# Patient Record
Sex: MALE | Race: WHITE | Employment: OTHER
[De-identification: names, ages, dates, MRNs, and addresses within clinical notes are randomized per-mention and may not be internally consistent; named-entity substitution may affect disease eponyms.]

---

## 2017-02-23 ENCOUNTER — OFFICE VISIT (OUTPATIENT)
Dept: INTERNAL MEDICINE | Facility: CLINIC | Age: 67
End: 2017-02-23

## 2017-02-23 VITALS
TEMPERATURE: 98.2 F | HEART RATE: 104 BPM | DIASTOLIC BLOOD PRESSURE: 60 MMHG | BODY MASS INDEX: 22.26 KG/M2 | RESPIRATION RATE: 16 BRPM | SYSTOLIC BLOOD PRESSURE: 134 MMHG | WEIGHT: 140 LBS

## 2017-02-23 DIAGNOSIS — E78.00 HIGH CHOLESTEROL: ICD-10-CM

## 2017-02-23 DIAGNOSIS — R73.9 HYPERGLYCEMIA: Primary | ICD-10-CM

## 2017-02-23 LAB
GLUCOSE BLD-MCNC: 144 MG/DL
HBA1C MFR BLD: 6.4 %

## 2017-02-23 PROCEDURE — G8428 CUR MEDS NOT DOCUMENT: HCPCS | Performed by: INTERNAL MEDICINE

## 2017-02-23 PROCEDURE — G8419 CALC BMI OUT NRM PARAM NOF/U: HCPCS | Performed by: INTERNAL MEDICINE

## 2017-02-23 PROCEDURE — 99213 OFFICE O/P EST LOW 20 MIN: CPT | Performed by: INTERNAL MEDICINE

## 2017-02-23 PROCEDURE — G8484 FLU IMMUNIZE NO ADMIN: HCPCS | Performed by: INTERNAL MEDICINE

## 2017-02-23 PROCEDURE — 4004F PT TOBACCO SCREEN RCVD TLK: CPT | Performed by: INTERNAL MEDICINE

## 2017-02-23 PROCEDURE — 83036 HEMOGLOBIN GLYCOSYLATED A1C: CPT | Performed by: INTERNAL MEDICINE

## 2017-02-23 PROCEDURE — 4040F PNEUMOC VAC/ADMIN/RCVD: CPT | Performed by: INTERNAL MEDICINE

## 2017-02-23 PROCEDURE — 82962 GLUCOSE BLOOD TEST: CPT | Performed by: INTERNAL MEDICINE

## 2017-02-23 PROCEDURE — 1123F ACP DISCUSS/DSCN MKR DOCD: CPT | Performed by: INTERNAL MEDICINE

## 2017-02-23 PROCEDURE — 3017F COLORECTAL CA SCREEN DOC REV: CPT | Performed by: INTERNAL MEDICINE

## 2017-05-17 RX ORDER — ATORVASTATIN CALCIUM 20 MG/1
TABLET, FILM COATED ORAL
Qty: 30 TABLET | Refills: 2 | Status: SHIPPED | OUTPATIENT
Start: 2017-05-17 | End: 2017-06-06 | Stop reason: SDUPTHER

## 2017-06-06 ENCOUNTER — OFFICE VISIT (OUTPATIENT)
Dept: INTERNAL MEDICINE CLINIC | Age: 67
End: 2017-06-06
Payer: MEDICARE

## 2017-06-06 VITALS
BODY MASS INDEX: 23.3 KG/M2 | TEMPERATURE: 97.7 F | HEIGHT: 66 IN | HEART RATE: 84 BPM | SYSTOLIC BLOOD PRESSURE: 146 MMHG | RESPIRATION RATE: 16 BRPM | WEIGHT: 145 LBS | DIASTOLIC BLOOD PRESSURE: 72 MMHG

## 2017-06-06 DIAGNOSIS — R73.9 HYPERGLYCEMIA: ICD-10-CM

## 2017-06-06 DIAGNOSIS — K21.9 GASTROESOPHAGEAL REFLUX DISEASE WITHOUT ESOPHAGITIS: ICD-10-CM

## 2017-06-06 DIAGNOSIS — E78.00 HIGH CHOLESTEROL: Primary | ICD-10-CM

## 2017-06-06 LAB
GLUCOSE BLD-MCNC: 130 MG/DL
HBA1C MFR BLD: 6.6 %

## 2017-06-06 PROCEDURE — 99214 OFFICE O/P EST MOD 30 MIN: CPT | Performed by: INTERNAL MEDICINE

## 2017-06-06 PROCEDURE — 3017F COLORECTAL CA SCREEN DOC REV: CPT | Performed by: INTERNAL MEDICINE

## 2017-06-06 PROCEDURE — 1123F ACP DISCUSS/DSCN MKR DOCD: CPT | Performed by: INTERNAL MEDICINE

## 2017-06-06 PROCEDURE — 4004F PT TOBACCO SCREEN RCVD TLK: CPT | Performed by: INTERNAL MEDICINE

## 2017-06-06 PROCEDURE — 82962 GLUCOSE BLOOD TEST: CPT | Performed by: INTERNAL MEDICINE

## 2017-06-06 PROCEDURE — 4040F PNEUMOC VAC/ADMIN/RCVD: CPT | Performed by: INTERNAL MEDICINE

## 2017-06-06 PROCEDURE — G8420 CALC BMI NORM PARAMETERS: HCPCS | Performed by: INTERNAL MEDICINE

## 2017-06-06 PROCEDURE — 83036 HEMOGLOBIN GLYCOSYLATED A1C: CPT | Performed by: INTERNAL MEDICINE

## 2017-06-06 PROCEDURE — G8427 DOCREV CUR MEDS BY ELIG CLIN: HCPCS | Performed by: INTERNAL MEDICINE

## 2017-06-06 RX ORDER — ATORVASTATIN CALCIUM 20 MG/1
TABLET, FILM COATED ORAL
Qty: 45 TABLET | Refills: 1 | Status: SHIPPED | OUTPATIENT
Start: 2017-06-06 | End: 2018-01-12 | Stop reason: CLARIF

## 2017-06-06 RX ORDER — OMEPRAZOLE 40 MG/1
40 CAPSULE, DELAYED RELEASE ORAL DAILY
Qty: 30 CAPSULE | Refills: 0 | Status: SHIPPED | OUTPATIENT
Start: 2017-06-06 | End: 2017-07-05 | Stop reason: SDUPTHER

## 2017-07-05 RX ORDER — OMEPRAZOLE 40 MG/1
CAPSULE, DELAYED RELEASE ORAL
Qty: 30 CAPSULE | Refills: 5 | Status: SHIPPED | OUTPATIENT
Start: 2017-07-05 | End: 2018-01-12 | Stop reason: SDUPTHER

## 2017-09-08 ENCOUNTER — HOSPITAL ENCOUNTER (OUTPATIENT)
Age: 67
Setting detail: SPECIMEN
Discharge: HOME | End: 2017-09-08
Payer: MEDICARE

## 2017-09-08 DIAGNOSIS — E78.00 HIGH CHOLESTEROL: ICD-10-CM

## 2017-09-08 DIAGNOSIS — R73.9 HYPERGLYCEMIA: ICD-10-CM

## 2017-09-08 DIAGNOSIS — K21.9 GASTROESOPHAGEAL REFLUX DISEASE WITHOUT ESOPHAGITIS: ICD-10-CM

## 2017-09-08 LAB
ALBUMIN SERPL-MCNC: 4.2 G/DL (ref 3.5–5.2)
ALBUMIN/GLOBULIN RATIO: 1.4 (ref 1–2.5)
ALP BLD-CCNC: 91 U/L (ref 40–129)
ALT SERPL-CCNC: 20 U/L (ref 5–41)
ANION GAP SERPL CALCULATED.3IONS-SCNC: 13 MMOL/L (ref 9–17)
AST SERPL-CCNC: 19 U/L
BILIRUB SERPL-MCNC: 0.25 MG/DL (ref 0.3–1.2)
BUN BLDV-MCNC: 10 MG/DL (ref 8–23)
BUN/CREAT BLD: ABNORMAL (ref 9–20)
CALCIUM SERPL-MCNC: 9.4 MG/DL (ref 8.6–10.4)
CHLORIDE BLD-SCNC: 102 MMOL/L (ref 98–107)
CHOLESTEROL/HDL RATIO: 7
CHOLESTEROL: 260 MG/DL
CO2: 24 MMOL/L (ref 20–31)
CREAT SERPL-MCNC: 0.88 MG/DL (ref 0.7–1.2)
CREATININE URINE: 89 MG/DL (ref 39–259)
ESTIMATED AVERAGE GLUCOSE: 157 MG/DL
GFR AFRICAN AMERICAN: >60 ML/MIN
GFR NON-AFRICAN AMERICAN: >60 ML/MIN
GFR SERPL CREATININE-BSD FRML MDRD: ABNORMAL ML/MIN/{1.73_M2}
GFR SERPL CREATININE-BSD FRML MDRD: ABNORMAL ML/MIN/{1.73_M2}
GLUCOSE BLD-MCNC: 142 MG/DL (ref 70–99)
HBA1C MFR BLD: 7.1 % (ref 4–6)
HDLC SERPL-MCNC: 37 MG/DL
LDL CHOLESTEROL: 160 MG/DL (ref 0–130)
MICROALBUMIN/CREAT 24H UR: 35 MG/L
MICROALBUMIN/CREAT UR-RTO: 39 MCG/MG CREAT
POTASSIUM SERPL-SCNC: 4.8 MMOL/L (ref 3.7–5.3)
SODIUM BLD-SCNC: 139 MMOL/L (ref 135–144)
TOTAL PROTEIN: 7.1 G/DL (ref 6.4–8.3)
TRIGL SERPL-MCNC: 317 MG/DL
VLDLC SERPL CALC-MCNC: ABNORMAL MG/DL (ref 1–30)

## 2017-09-12 ENCOUNTER — OFFICE VISIT (OUTPATIENT)
Dept: INTERNAL MEDICINE CLINIC | Age: 67
End: 2017-09-12
Payer: MEDICARE

## 2017-09-12 VITALS
WEIGHT: 148.8 LBS | SYSTOLIC BLOOD PRESSURE: 128 MMHG | HEART RATE: 80 BPM | RESPIRATION RATE: 16 BRPM | TEMPERATURE: 97.7 F | HEIGHT: 67 IN | BODY MASS INDEX: 23.35 KG/M2 | DIASTOLIC BLOOD PRESSURE: 70 MMHG

## 2017-09-12 DIAGNOSIS — M25.511 CHRONIC RIGHT SHOULDER PAIN: ICD-10-CM

## 2017-09-12 DIAGNOSIS — B35.1 TOENAIL FUNGUS: ICD-10-CM

## 2017-09-12 DIAGNOSIS — E78.00 HIGH CHOLESTEROL: ICD-10-CM

## 2017-09-12 DIAGNOSIS — K21.9 GASTROESOPHAGEAL REFLUX DISEASE WITHOUT ESOPHAGITIS: ICD-10-CM

## 2017-09-12 DIAGNOSIS — K64.9 HEMORRHOIDS, UNSPECIFIED HEMORRHOID TYPE: ICD-10-CM

## 2017-09-12 DIAGNOSIS — E11.9 TYPE 2 DIABETES MELLITUS WITHOUT COMPLICATION, WITHOUT LONG-TERM CURRENT USE OF INSULIN (HCC): Primary | ICD-10-CM

## 2017-09-12 DIAGNOSIS — G89.29 CHRONIC RIGHT SHOULDER PAIN: ICD-10-CM

## 2017-09-12 DIAGNOSIS — R21 SKIN RASH: ICD-10-CM

## 2017-09-12 PROCEDURE — G8420 CALC BMI NORM PARAMETERS: HCPCS | Performed by: INTERNAL MEDICINE

## 2017-09-12 PROCEDURE — 4004F PT TOBACCO SCREEN RCVD TLK: CPT | Performed by: INTERNAL MEDICINE

## 2017-09-12 PROCEDURE — 4040F PNEUMOC VAC/ADMIN/RCVD: CPT | Performed by: INTERNAL MEDICINE

## 2017-09-12 PROCEDURE — G8427 DOCREV CUR MEDS BY ELIG CLIN: HCPCS | Performed by: INTERNAL MEDICINE

## 2017-09-12 PROCEDURE — 99215 OFFICE O/P EST HI 40 MIN: CPT | Performed by: INTERNAL MEDICINE

## 2017-09-12 PROCEDURE — 3046F HEMOGLOBIN A1C LEVEL >9.0%: CPT | Performed by: INTERNAL MEDICINE

## 2017-09-12 PROCEDURE — 1123F ACP DISCUSS/DSCN MKR DOCD: CPT | Performed by: INTERNAL MEDICINE

## 2017-09-12 PROCEDURE — 3017F COLORECTAL CA SCREEN DOC REV: CPT | Performed by: INTERNAL MEDICINE

## 2017-09-12 RX ORDER — DESONIDE 0.5 MG/G
CREAM TOPICAL
Qty: 1 TUBE | Refills: 0 | Status: SHIPPED | OUTPATIENT
Start: 2017-09-12 | End: 2020-01-08

## 2017-09-12 RX ORDER — PERMETHRIN 50 MG/G
CREAM TOPICAL
Qty: 1 TUBE | Refills: 0 | Status: SHIPPED | OUTPATIENT
Start: 2017-09-12 | End: 2018-01-12 | Stop reason: CLARIF

## 2017-09-14 ENCOUNTER — TELEPHONE (OUTPATIENT)
Dept: INTERNAL MEDICINE CLINIC | Age: 67
End: 2017-09-14

## 2017-09-26 ENCOUNTER — HOSPITAL ENCOUNTER (OUTPATIENT)
Age: 67
Discharge: HOME | End: 2017-09-26
Payer: MEDICARE

## 2017-09-26 ENCOUNTER — HOSPITAL ENCOUNTER (OUTPATIENT)
Dept: GENERAL RADIOLOGY | Age: 67
Discharge: HOME | End: 2017-09-26
Payer: MEDICARE

## 2017-09-26 ENCOUNTER — OFFICE VISIT (OUTPATIENT)
Dept: ORTHOPEDIC SURGERY | Age: 67
End: 2017-09-26
Payer: MEDICARE

## 2017-09-26 VITALS — HEIGHT: 67 IN | BODY MASS INDEX: 23.36 KG/M2 | WEIGHT: 148.81 LBS

## 2017-09-26 DIAGNOSIS — M19.011 PRIMARY OSTEOARTHRITIS OF RIGHT SHOULDER: ICD-10-CM

## 2017-09-26 DIAGNOSIS — M47.812 SPONDYLOSIS OF CERVICAL REGION WITHOUT MYELOPATHY OR RADICULOPATHY: ICD-10-CM

## 2017-09-26 DIAGNOSIS — M47.812 SPONDYLOSIS OF CERVICAL REGION WITHOUT MYELOPATHY OR RADICULOPATHY: Primary | ICD-10-CM

## 2017-09-26 PROCEDURE — 1123F ACP DISCUSS/DSCN MKR DOCD: CPT | Performed by: ORTHOPAEDIC SURGERY

## 2017-09-26 PROCEDURE — 73030 X-RAY EXAM OF SHOULDER: CPT

## 2017-09-26 PROCEDURE — 72040 X-RAY EXAM NECK SPINE 2-3 VW: CPT

## 2017-09-26 PROCEDURE — 99213 OFFICE O/P EST LOW 20 MIN: CPT | Performed by: ORTHOPAEDIC SURGERY

## 2017-09-26 PROCEDURE — G8427 DOCREV CUR MEDS BY ELIG CLIN: HCPCS | Performed by: ORTHOPAEDIC SURGERY

## 2017-09-26 PROCEDURE — 3017F COLORECTAL CA SCREEN DOC REV: CPT | Performed by: ORTHOPAEDIC SURGERY

## 2017-09-26 PROCEDURE — 73020 X-RAY EXAM OF SHOULDER: CPT

## 2017-09-26 PROCEDURE — G8420 CALC BMI NORM PARAMETERS: HCPCS | Performed by: ORTHOPAEDIC SURGERY

## 2017-09-26 PROCEDURE — 4004F PT TOBACCO SCREEN RCVD TLK: CPT | Performed by: ORTHOPAEDIC SURGERY

## 2017-09-26 PROCEDURE — 4040F PNEUMOC VAC/ADMIN/RCVD: CPT | Performed by: ORTHOPAEDIC SURGERY

## 2017-09-29 ENCOUNTER — OFFICE VISIT (OUTPATIENT)
Dept: INTERNAL MEDICINE CLINIC | Age: 67
End: 2017-09-29
Payer: MEDICARE

## 2017-09-29 VITALS
OXYGEN SATURATION: 97 % | HEART RATE: 94 BPM | HEIGHT: 67 IN | TEMPERATURE: 98.7 F | SYSTOLIC BLOOD PRESSURE: 128 MMHG | BODY MASS INDEX: 23.32 KG/M2 | DIASTOLIC BLOOD PRESSURE: 78 MMHG | WEIGHT: 148.6 LBS

## 2017-09-29 DIAGNOSIS — R41.3 MEMORY PROBLEM: Primary | ICD-10-CM

## 2017-09-29 PROCEDURE — G8427 DOCREV CUR MEDS BY ELIG CLIN: HCPCS | Performed by: INTERNAL MEDICINE

## 2017-09-29 PROCEDURE — 4040F PNEUMOC VAC/ADMIN/RCVD: CPT | Performed by: INTERNAL MEDICINE

## 2017-09-29 PROCEDURE — 3017F COLORECTAL CA SCREEN DOC REV: CPT | Performed by: INTERNAL MEDICINE

## 2017-09-29 PROCEDURE — G8420 CALC BMI NORM PARAMETERS: HCPCS | Performed by: INTERNAL MEDICINE

## 2017-09-29 PROCEDURE — 1123F ACP DISCUSS/DSCN MKR DOCD: CPT | Performed by: INTERNAL MEDICINE

## 2017-09-29 PROCEDURE — 99213 OFFICE O/P EST LOW 20 MIN: CPT | Performed by: INTERNAL MEDICINE

## 2017-09-29 PROCEDURE — 4004F PT TOBACCO SCREEN RCVD TLK: CPT | Performed by: INTERNAL MEDICINE

## 2017-09-29 NOTE — PROGRESS NOTES
hematuria   Musculoskeletal: No arthralgias, back pain or myalgias   Skin                  : Negative for rash or erythema   Neurological    : Negative for dizziness, weakness, tremors ,light headedness or syncope   Psychiatric       : Negative for dysphoric mood, sleep disturbances, nervous or anxious, or decreased concentration   All other review of systems was negative    Objective  Physical Examination:    Nursing note reviewed    /78 (Site: Right Arm, Position: Sitting, Cuff Size: Medium Adult)  Pulse 94  Temp 98.7 °F (37.1 °C) (Oral)   Ht 5' 6.93\" (1.7 m)  Wt 148 lb 9.6 oz (67.4 kg)  SpO2 97%  BMI 23.32 kg/m2  BP Readings from Last 3 Encounters:   09/29/17 128/78   09/12/17 128/70   06/06/17 (!) 146/72         Constitutional:  Stephanie Ordoñez is oriented to place, person and time ,appears well-developed and well-nourished  HEENT:  Atraumatic and normocephalic, external ears normal bilaterally, nose normal no oropharyngeal exudate and is clear and moist  Eyes:  EOCM normal; conjunctivae normal; PERRLA bilaterally  Neck:  Normal range of motion, neck supple, no JVD and no thyromegaly  Cardiovascular:  RRR, normal heart sounds and intact distal pulses  Pulmonary:  effort normal and breath sounds normal bilaterally,no wheezes or rales, no respiratory distress  Abdominal:  Soft, non-tender; normal bowel sounds, no masses  Musculoskeletal:  Normal range of motion and no edema or tenderness bilaterally  No lymphadenopathy  Neurological:  alert, oriented, and normal reflexes bilaterally  Skin: warm and dry  Psychiatric:  normal mood and effect; behavior normal.    Labs:   Lab Results   Component Value Date    LABA1C 7.1 (H) 09/08/2017     Lab Results   Component Value Date    CHOL 260 (H) 09/08/2017     Lab Results   Component Value Date    HDL 37 (L) 09/08/2017     No results found for: [address]  Lab Results   Component Value Date    TRIG 317 (H) 09/08/2017     No components found for: [place], [place]  Lab Results Component Value Date    WBC 15.5 (H) 10/18/2016    HGB 13.9 10/18/2016    HCT 42.1 10/18/2016    MCV 95.6 10/18/2016     (H) 10/18/2016     No results found for: INR, PROTIME  Lab Results   Component Value Date    GLUCOSE 142 (H) 09/08/2017    CREATININE 0.88 09/08/2017    BUN 10 09/08/2017     09/08/2017    K 4.8 09/08/2017     09/08/2017    CO2 24 09/08/2017     Lab Results   Component Value Date    ALT 20 09/08/2017    AST 19 09/08/2017    ALKPHOS 91 09/08/2017    BILITOT 0.25 (L) 09/08/2017     Lab Results   Component Value Date    LABALBU 4.2 09/08/2017     Lab Results   Component Value Date    TSH 2.31 10/18/2016     Assessment:  1. Memory problem        Plan:  Mini-Mental Status Examination done  Patient is referred to neurology for evaluation for dementia  Review as scheduled           1. Nicho Ramirez received counseling on the following healthy behaviors: nutrition, exercise and tobacco cessation    2. Prior labs and health maintenance reviewed. 3.  Discussed use, benefit, and side effects of prescribed medications. Barriers to medication compliance addressed. All his questions were answered. Pt voiced understanding. Nicho Ramirez will continue current medications, diet and exercise. No orders of the defined types were placed in this encounter. Completed Refills               Requested Prescriptions      No prescriptions requested or ordered in this encounter     4. Patient given educational materials - see patient instructions    5. Was a self-tracking handout given in paper form or via [employer]? NO    No orders of the defined types were placed in this encounter. No Follow-up on file. Patient voiced understanding and agreed to treatment plan.      Electronically signed by Denis Nogueira MD on 9/29/2017 at 11:52 AM    This note is created with a voice recognition program and while intend to generate a document that accurately reflects the content of the visit, no

## 2017-10-05 ENCOUNTER — HOSPITAL ENCOUNTER (OUTPATIENT)
Age: 67
Discharge: HOME | End: 2017-10-05
Payer: MEDICARE

## 2017-10-05 ENCOUNTER — HOSPITAL ENCOUNTER (OUTPATIENT)
Dept: GENERAL RADIOLOGY | Age: 67
Discharge: HOME | End: 2017-10-05
Payer: MEDICARE

## 2017-10-05 ENCOUNTER — OFFICE VISIT (OUTPATIENT)
Dept: ORTHOPEDIC SURGERY | Age: 67
End: 2017-10-05
Payer: MEDICARE

## 2017-10-05 VITALS — HEIGHT: 67 IN | BODY MASS INDEX: 23.32 KG/M2 | WEIGHT: 148.59 LBS

## 2017-10-05 DIAGNOSIS — Z87.891 HISTORY OF PRIOR CIGARETTE SMOKING: ICD-10-CM

## 2017-10-05 DIAGNOSIS — M47.812 SPONDYLOSIS OF CERVICAL REGION WITHOUT MYELOPATHY OR RADICULOPATHY: Primary | ICD-10-CM

## 2017-10-05 PROCEDURE — 99213 OFFICE O/P EST LOW 20 MIN: CPT | Performed by: ORTHOPAEDIC SURGERY

## 2017-10-05 PROCEDURE — G8420 CALC BMI NORM PARAMETERS: HCPCS | Performed by: ORTHOPAEDIC SURGERY

## 2017-10-05 PROCEDURE — G8427 DOCREV CUR MEDS BY ELIG CLIN: HCPCS | Performed by: ORTHOPAEDIC SURGERY

## 2017-10-05 PROCEDURE — 1123F ACP DISCUSS/DSCN MKR DOCD: CPT | Performed by: ORTHOPAEDIC SURGERY

## 2017-10-05 PROCEDURE — 4040F PNEUMOC VAC/ADMIN/RCVD: CPT | Performed by: ORTHOPAEDIC SURGERY

## 2017-10-05 PROCEDURE — 71020 XR CHEST STANDARD TWO VW: CPT

## 2017-10-05 PROCEDURE — 4004F PT TOBACCO SCREEN RCVD TLK: CPT | Performed by: ORTHOPAEDIC SURGERY

## 2017-10-05 PROCEDURE — 3017F COLORECTAL CA SCREEN DOC REV: CPT | Performed by: ORTHOPAEDIC SURGERY

## 2017-10-05 PROCEDURE — G8484 FLU IMMUNIZE NO ADMIN: HCPCS | Performed by: ORTHOPAEDIC SURGERY

## 2017-10-05 NOTE — PROGRESS NOTES
This patient who was seen on September 26 is seen here again still having pain over the right shoulder. In the last dictation in the third    I mentioned that the caretaker thought that he was very forgetful. On one occasion he got into a stranger's car and the stranger then dropped him back about 4 hours later. The patient does not recall this event at all. He had x-rays of the cervical spine and the shoulder. They could not do them last visit because they had another appointment. X-rays of the cervical spine show there is significant degenerative changes in the lower cervical spine. X-ray of the shoulder shows mild degenerative changes in the acromioclavicular joint. Diagnosis: Cervical spondylosis. #2 right acromioclavicular joint osteoarthritis. Treatment: I'm referring him to physical therapy. He can also take Aleve with food because he does have GERD and takes Protonix for it. I'll see him in 1 month.

## 2017-10-05 NOTE — MR AVS SNAPSHOT
After Visit Summary             Evelyn Crouch   10/5/2017 9:30 AM   Office Visit    Description:  Male : 1950   Provider:  Everardo Conte MD   Department:  [address]              Your Follow-Up and Future Appointments         Below is a list of your follow-up and future appointments. This may not be a complete list as you may have made appointments directly with providers that we are not aware of or your providers may have made some for you. Please call your providers to confirm appointments. It is important to keep your appointments. Please bring your current insurance card, photo ID, co-pay, and all medication bottles to your appointment. If self-pay, payment is expected at the time of service. Your To-Do List     Future Appointments Provider Department Dept Phone    2017 9:10 AM MD [address] [phone]    Please arrive 15 minutes prior to appointment time, bring insurance card and photo ID.     2017 10:15 AM Ailyn Torrez MD [facility] [phone]    Please arrive 15 minutes prior to appointment, bring photo ID and insurance card. Future Orders Complete By Expires    XR CHEST STANDARD (2 VW) [41102 Custom]  10/5/2017 (Approximate) 10/25/2018    Comments:    AP - Lateral    Follow-Up    Return in about 4 weeks (around 2017).          Information from Your Visit        Department     Name Address Phone Fax    [address] [phone]      You Were Seen for:         Comments    Spondylosis of cervical region without myelopathy or radiculopathy   [[id number]]         Vital Signs     Height Weight Body Mass Index Smoking Status          5' 6.93\" (1.7 m) 148 lb 9.4 oz (67.4 kg) 23.32 kg/m2 Current Every Day Smoker           Medications and Orders      Your Current Medications Are Use a nicotine replacement product or medication       Immunizations as of 10/5/2017     Name Date    Influenza, Kaitlin Ebbs, 3 Years and older, IM 10/19/2016      Preventive Care        Date Due    Eye Exam By An Eye Doctor 11/29/1958    Tetanus Combination Vaccine (1 - Tdap) 11/29/1967    Zoster Vaccine 11/29/2008    Yearly Flu Vaccine (1) 9/1/2017    Pneumococcal Vaccines (two) for all adults aged 72 and over (1 of 2 - PCV13) 6/4/2018 (Originally 11/29/2013)    Hepatitis C screening is recommended for all adults regardless of risk factors born between [facility] [place] [facility] at least once (lifetime) who have never been tested. 9/14/2018 (Originally 1950)    One-time abdominal aortic aneurism (AAA) screening is recommended for all men between the age of 73-68 who have ever smoked 9/20/2018 (Originally 1950)    Hemoglobin A1C (Test For Long-Term Glucose Control) 9/8/2018    Urine Check For Kidney Problems 9/8/2018    Cholesterol Screening 9/8/2018    Diabetic Foot Exam 9/12/2018    Colonoscopy 4/8/2019            MyChart Signup           Our records indicate that you have declined MyChart signup.

## 2017-10-16 ENCOUNTER — HOSPITAL ENCOUNTER (OUTPATIENT)
Dept: PHYSICAL THERAPY | Facility: CLINIC | Age: 67
Setting detail: THERAPIES SERIES
Discharge: HOME | End: 2017-10-16
Payer: MEDICARE

## 2017-11-13 ENCOUNTER — OFFICE VISIT (OUTPATIENT)
Dept: INTERNAL MEDICINE CLINIC | Age: 67
End: 2017-11-13
Payer: MEDICARE

## 2017-11-13 VITALS
HEART RATE: 104 BPM | TEMPERATURE: 97.9 F | DIASTOLIC BLOOD PRESSURE: 74 MMHG | HEIGHT: 67 IN | SYSTOLIC BLOOD PRESSURE: 144 MMHG | BODY MASS INDEX: 23.2 KG/M2 | WEIGHT: 147.8 LBS | RESPIRATION RATE: 16 BRPM

## 2017-11-13 DIAGNOSIS — E78.00 HIGH CHOLESTEROL: ICD-10-CM

## 2017-11-13 DIAGNOSIS — Z23 NEED FOR INFLUENZA VACCINATION: ICD-10-CM

## 2017-11-13 DIAGNOSIS — R41.3 MEMORY PROBLEM: ICD-10-CM

## 2017-11-13 DIAGNOSIS — E11.9 TYPE 2 DIABETES MELLITUS WITHOUT COMPLICATION, WITHOUT LONG-TERM CURRENT USE OF INSULIN (HCC): Primary | ICD-10-CM

## 2017-11-13 PROCEDURE — 3045F PR MOST RECENT HEMOGLOBIN A1C LEVEL 7.0-9.0%: CPT | Performed by: INTERNAL MEDICINE

## 2017-11-13 PROCEDURE — 4040F PNEUMOC VAC/ADMIN/RCVD: CPT | Performed by: INTERNAL MEDICINE

## 2017-11-13 PROCEDURE — G8484 FLU IMMUNIZE NO ADMIN: HCPCS | Performed by: INTERNAL MEDICINE

## 2017-11-13 PROCEDURE — 1123F ACP DISCUSS/DSCN MKR DOCD: CPT | Performed by: INTERNAL MEDICINE

## 2017-11-13 PROCEDURE — 99214 OFFICE O/P EST MOD 30 MIN: CPT | Performed by: INTERNAL MEDICINE

## 2017-11-13 PROCEDURE — G8427 DOCREV CUR MEDS BY ELIG CLIN: HCPCS | Performed by: INTERNAL MEDICINE

## 2017-11-13 PROCEDURE — G0008 ADMIN INFLUENZA VIRUS VAC: HCPCS | Performed by: INTERNAL MEDICINE

## 2017-11-13 PROCEDURE — G8420 CALC BMI NORM PARAMETERS: HCPCS | Performed by: INTERNAL MEDICINE

## 2017-11-13 PROCEDURE — 3017F COLORECTAL CA SCREEN DOC REV: CPT | Performed by: INTERNAL MEDICINE

## 2017-11-13 PROCEDURE — 90688 IIV4 VACCINE SPLT 0.5 ML IM: CPT | Performed by: INTERNAL MEDICINE

## 2017-11-13 PROCEDURE — 4004F PT TOBACCO SCREEN RCVD TLK: CPT | Performed by: INTERNAL MEDICINE

## 2017-11-13 NOTE — PROGRESS NOTES
 Diabetic microalbuminuria test  09/08/2018    Lipid screen  09/08/2018    Diabetic foot exam  09/12/2018    Colon cancer screen colonoscopy  04/08/2019

## 2017-11-13 NOTE — PROGRESS NOTES
of appetite and unexpected weight change   HEENT            : Negative for neck stiffness and pain, no congestion or sinus pressure   Eyes                : No visual disturbance or pain   Cardiovascular: No chest pain or palpitations or leg swelling   Respiratory      : Negative for cough, shortness of breath or wheezing   Gastrointestinal: Negative for abdominal pain, constipation or diarrhea and bloating No nausea or vomiting   Genitourinary:     No urgency or frequency, no burning or hematuria   Musculoskeletal: No arthralgias, back pain or myalgias   Skin                  : Negative for rash or erythema   Neurological    : Negative for dizziness, weakness, tremors ,light headedness or syncope   Psychiatric       : Negative for dysphoric mood, sleep disturbances, nervous or anxious, or decreased concentration   All other review of systems was negative    Objective  Physical Examination:    Nursing note reviewed    BP (!) 144/74   Pulse 104   Temp 97.9 °F (36.6 °C) (Oral)   Resp 16   Ht 5' 7\" (1.702 m)   Wt 147 lb 12.8 oz (67 kg)   BMI 23.15 kg/m²   BP Readings from Last 3 Encounters:   11/13/17 (!) 144/74   09/29/17 128/78   09/12/17 128/70         Constitutional:  Jj Vo is oriented to place, person and time ,appears well-developed and well-nourished  HEENT:  Atraumatic and normocephalic, external ears normal bilaterally, nose normal no oropharyngeal exudate and is clear and moist  Eyes:  EOCM normal; conjunctivae normal; PERRLA bilaterally  Neck:  Normal range of motion, neck supple, no JVD and no thyromegaly  Cardiovascular:  RRR, normal heart sounds and intact distal pulses  Pulmonary:  effort normal and breath sounds normal bilaterally,no wheezes or rales, no respiratory distress  Abdominal:  Soft, non-tender; normal bowel sounds, no masses  Musculoskeletal:  Normal range of motion and no edema or tenderness bilaterally  No lymphadenopathy  Neurological:  alert, oriented, and normal reflexes bilaterally  Skin: warm and dry  Psychiatric:  normal mood and effect; behavior normal.    Labs:   Lab Results   Component Value Date    LABA1C 7.1 (H) 09/08/2017     Lab Results   Component Value Date    CHOL 260 (H) 09/08/2017     Lab Results   Component Value Date    HDL 37 (L) 09/08/2017     No results found for: [address]  Lab Results   Component Value Date    TRIG 317 (H) 09/08/2017     No components found for: [place], [place]  Lab Results   Component Value Date    WBC 15.5 (H) 10/18/2016    HGB 13.9 10/18/2016    HCT 42.1 10/18/2016    MCV 95.6 10/18/2016     (H) 10/18/2016     No results found for: INR, PROTIME  Lab Results   Component Value Date    GLUCOSE 142 (H) 09/08/2017    CREATININE 0.88 09/08/2017    BUN 10 09/08/2017     09/08/2017    K 4.8 09/08/2017     09/08/2017    CO2 24 09/08/2017     Lab Results   Component Value Date    ALT 20 09/08/2017    AST 19 09/08/2017    ALKPHOS 91 09/08/2017    BILITOT 0.25 (L) 09/08/2017     Lab Results   Component Value Date    LABALBU 4.2 09/08/2017     Lab Results   Component Value Date    TSH 2.31 10/18/2016     Assessment:  1. Type 2 diabetes mellitus without complication, without long-term current use of insulin ([address]    2. Need for influenza vaccination    3. High cholesterol    4.  Memory problem        Plan:  Patient's blood sugars in the last about 2 months reviewed which were still elevated both fasting and postprandial and so advised patient to increase metformin to twice a day and monitor blood sugars and will repeat lab work in 2 months  Patient then caregiver says that they did not see the neurologist as recommended for memory problems because there was a death in the family and never made an appointment again and so advised to make another appointment and referral made as needed  Patient's blood pressure is elevated and advised to monitor and will recheck in 2 months and if this still high will start on medication  Patient is currently taking created with a voice recognition program and while intend to generate a document that accurately reflects the content of the visit, no guarantee can be provided that every mistake has been identified and corrected by editing.

## 2017-11-21 ENCOUNTER — OFFICE VISIT (OUTPATIENT)
Dept: ORTHOPEDIC SURGERY | Age: 67
End: 2017-11-21
Payer: MEDICARE

## 2017-11-21 VITALS — BODY MASS INDEX: 23.18 KG/M2 | HEIGHT: 67 IN | WEIGHT: 147.71 LBS

## 2017-11-21 DIAGNOSIS — M47.812 SPONDYLOSIS OF CERVICAL REGION WITHOUT MYELOPATHY OR RADICULOPATHY: Primary | ICD-10-CM

## 2017-11-21 DIAGNOSIS — M75.101 TEAR OF RIGHT ROTATOR CUFF, UNSPECIFIED TEAR EXTENT: ICD-10-CM

## 2017-11-21 PROCEDURE — 3017F COLORECTAL CA SCREEN DOC REV: CPT | Performed by: ORTHOPAEDIC SURGERY

## 2017-11-21 PROCEDURE — G8420 CALC BMI NORM PARAMETERS: HCPCS | Performed by: ORTHOPAEDIC SURGERY

## 2017-11-21 PROCEDURE — G8427 DOCREV CUR MEDS BY ELIG CLIN: HCPCS | Performed by: ORTHOPAEDIC SURGERY

## 2017-11-21 PROCEDURE — 99213 OFFICE O/P EST LOW 20 MIN: CPT | Performed by: ORTHOPAEDIC SURGERY

## 2017-11-21 PROCEDURE — 4040F PNEUMOC VAC/ADMIN/RCVD: CPT | Performed by: ORTHOPAEDIC SURGERY

## 2017-11-21 PROCEDURE — 1123F ACP DISCUSS/DSCN MKR DOCD: CPT | Performed by: ORTHOPAEDIC SURGERY

## 2017-11-21 PROCEDURE — G8484 FLU IMMUNIZE NO ADMIN: HCPCS | Performed by: ORTHOPAEDIC SURGERY

## 2017-11-21 PROCEDURE — 4004F PT TOBACCO SCREEN RCVD TLK: CPT | Performed by: ORTHOPAEDIC SURGERY

## 2017-11-21 NOTE — PROGRESS NOTES
This patient states that he is doing much better now. He has no symptoms. Examination of cervical spine does show there is still some stiffness in extension and lateral flexion to left and right but without any pain. The shoulder has excellent motion. I also told him that his chest x-ray shows COPD no obvious cancer. He may return to normal activities and we'll see him as necessary.

## 2018-01-10 ENCOUNTER — HOSPITAL ENCOUNTER (OUTPATIENT)
Age: 68
Setting detail: SPECIMEN
Discharge: HOME | End: 2018-01-10
Payer: MEDICARE

## 2018-01-10 DIAGNOSIS — E11.9 TYPE 2 DIABETES MELLITUS WITHOUT COMPLICATION, WITHOUT LONG-TERM CURRENT USE OF INSULIN (HCC): ICD-10-CM

## 2018-01-10 DIAGNOSIS — Z23 NEED FOR INFLUENZA VACCINATION: ICD-10-CM

## 2018-01-10 DIAGNOSIS — E78.00 HIGH CHOLESTEROL: ICD-10-CM

## 2018-01-10 DIAGNOSIS — R41.3 MEMORY PROBLEM: ICD-10-CM

## 2018-01-10 LAB
ALBUMIN SERPL-MCNC: 3.9 G/DL (ref 3.5–5.2)
ALBUMIN/GLOBULIN RATIO: 1 (ref 1–2.5)
ALP BLD-CCNC: 101 U/L (ref 40–129)
ALT SERPL-CCNC: 22 U/L (ref 5–41)
ANION GAP SERPL CALCULATED.3IONS-SCNC: 17 MMOL/L (ref 9–17)
AST SERPL-CCNC: 22 U/L
BILIRUB SERPL-MCNC: 0.32 MG/DL (ref 0.3–1.2)
BUN BLDV-MCNC: 12 MG/DL (ref 8–23)
BUN/CREAT BLD: ABNORMAL (ref 9–20)
CALCIUM SERPL-MCNC: 9.5 MG/DL (ref 8.6–10.4)
CHLORIDE BLD-SCNC: 97 MMOL/L (ref 98–107)
CHOLESTEROL/HDL RATIO: 4.1
CHOLESTEROL: 176 MG/DL
CO2: 24 MMOL/L (ref 20–31)
CREAT SERPL-MCNC: 0.85 MG/DL (ref 0.7–1.2)
CREATININE URINE: 192.5 MG/DL (ref 39–259)
ESTIMATED AVERAGE GLUCOSE: 137 MG/DL
GFR AFRICAN AMERICAN: >60 ML/MIN
GFR NON-AFRICAN AMERICAN: >60 ML/MIN
GFR SERPL CREATININE-BSD FRML MDRD: ABNORMAL ML/MIN/{1.73_M2}
GFR SERPL CREATININE-BSD FRML MDRD: ABNORMAL ML/MIN/{1.73_M2}
GLUCOSE BLD-MCNC: 155 MG/DL (ref 70–99)
HBA1C MFR BLD: 6.4 % (ref 4–6)
HCT VFR BLD CALC: 40.1 % (ref 40.7–50.3)
HDLC SERPL-MCNC: 43 MG/DL
HEMOGLOBIN: 12.8 G/DL (ref 13–17)
LDL CHOLESTEROL: 110 MG/DL (ref 0–130)
MCH RBC QN AUTO: 31.6 PG (ref 25.2–33.5)
MCHC RBC AUTO-ENTMCNC: 31.9 G/DL (ref 28.4–34.8)
MCV RBC AUTO: 99 FL (ref 82.6–102.9)
MICROALBUMIN/CREAT 24H UR: 145 MG/L
MICROALBUMIN/CREAT UR-RTO: 75 MCG/MG CREAT
PDW BLD-RTO: 12.6 % (ref 11.8–14.4)
PLATELET # BLD: 539 K/UL (ref 138–453)
PMV BLD AUTO: 10.9 FL (ref 8.1–13.5)
POTASSIUM SERPL-SCNC: 4.3 MMOL/L (ref 3.7–5.3)
RBC # BLD: 4.05 M/UL (ref 4.21–5.77)
SODIUM BLD-SCNC: 138 MMOL/L (ref 135–144)
TOTAL PROTEIN: 7.8 G/DL (ref 6.4–8.3)
TRIGL SERPL-MCNC: 113 MG/DL
VLDLC SERPL CALC-MCNC: NORMAL MG/DL (ref 1–30)
WBC # BLD: 24.1 K/UL (ref 3.5–11.3)

## 2018-01-12 ENCOUNTER — HOSPITAL ENCOUNTER (OUTPATIENT)
Dept: GENERAL RADIOLOGY | Age: 68
Discharge: HOME | End: 2018-01-12
Payer: MEDICARE

## 2018-01-12 ENCOUNTER — HOSPITAL ENCOUNTER (OUTPATIENT)
Age: 68
Discharge: HOME | End: 2018-01-12
Payer: MEDICARE

## 2018-01-12 ENCOUNTER — TELEPHONE (OUTPATIENT)
Dept: INTERNAL MEDICINE CLINIC | Age: 68
End: 2018-01-12

## 2018-01-12 ENCOUNTER — OFFICE VISIT (OUTPATIENT)
Dept: INTERNAL MEDICINE CLINIC | Age: 68
End: 2018-01-12
Payer: MEDICARE

## 2018-01-12 VITALS
HEART RATE: 96 BPM | BODY MASS INDEX: 22.7 KG/M2 | DIASTOLIC BLOOD PRESSURE: 60 MMHG | RESPIRATION RATE: 18 BRPM | SYSTOLIC BLOOD PRESSURE: 110 MMHG | WEIGHT: 144.6 LBS | HEIGHT: 67 IN | TEMPERATURE: 98.3 F

## 2018-01-12 DIAGNOSIS — E78.00 HIGH CHOLESTEROL: ICD-10-CM

## 2018-01-12 DIAGNOSIS — E11.9 TYPE 2 DIABETES MELLITUS WITHOUT COMPLICATION, WITHOUT LONG-TERM CURRENT USE OF INSULIN (HCC): ICD-10-CM

## 2018-01-12 DIAGNOSIS — D72.829 LEUKOCYTOSIS, UNSPECIFIED TYPE: ICD-10-CM

## 2018-01-12 DIAGNOSIS — R05.9 COUGH: ICD-10-CM

## 2018-01-12 DIAGNOSIS — D72.829 LEUKOCYTOSIS, UNSPECIFIED TYPE: Primary | ICD-10-CM

## 2018-01-12 DIAGNOSIS — R05.9 COUGH: Primary | ICD-10-CM

## 2018-01-12 LAB
ABSOLUTE EOS #: 0 K/UL (ref 0–0.4)
ABSOLUTE IMMATURE GRANULOCYTE: ABNORMAL K/UL (ref 0–0.3)
ABSOLUTE LYMPH #: 3.78 K/UL (ref 1–4.8)
ABSOLUTE MONO #: 2.7 K/UL (ref 0.2–0.8)
BASOPHILS # BLD: 0 %
BASOPHILS ABSOLUTE: 0 K/UL (ref 0–0.2)
DIFFERENTIAL TYPE: ABNORMAL
EOSINOPHILS RELATIVE PERCENT: 0 % (ref 1–4)
HCT VFR BLD CALC: 37.6 % (ref 41–53)
HEMOGLOBIN: 12.4 G/DL (ref 13.5–17.5)
IMMATURE GRANULOCYTES: ABNORMAL %
LACTATE DEHYDROGENASE: 181 U/L (ref 135–225)
LYMPHOCYTES # BLD: 14 % (ref 24–44)
MCH RBC QN AUTO: 31.4 PG (ref 26–34)
MCHC RBC AUTO-ENTMCNC: 33.1 G/DL (ref 31–37)
MCV RBC AUTO: 94.9 FL (ref 80–100)
MONOCYTES # BLD: 10 % (ref 1–7)
MORPHOLOGY: ABNORMAL
PDW BLD-RTO: 13 % (ref 11.5–14.5)
PLATELET # BLD: 671 K/UL (ref 130–400)
PLATELET ESTIMATE: ABNORMAL
PMV BLD AUTO: 8.1 FL (ref 6–12)
RBC # BLD: 3.96 M/UL (ref 4.5–5.9)
RBC # BLD: ABNORMAL 10*6/UL
SEG NEUTROPHILS: 76 % (ref 36–66)
SEGMENTED NEUTROPHILS ABSOLUTE COUNT: 20.52 K/UL (ref 1.8–7.7)
WBC # BLD: 27 K/UL (ref 3.5–11)
WBC # BLD: ABNORMAL 10*3/UL

## 2018-01-12 PROCEDURE — G8484 FLU IMMUNIZE NO ADMIN: HCPCS | Performed by: INTERNAL MEDICINE

## 2018-01-12 PROCEDURE — 3017F COLORECTAL CA SCREEN DOC REV: CPT | Performed by: INTERNAL MEDICINE

## 2018-01-12 PROCEDURE — 71046 X-RAY EXAM CHEST 2 VIEWS: CPT

## 2018-01-12 PROCEDURE — 3044F HG A1C LEVEL LT 7.0%: CPT | Performed by: INTERNAL MEDICINE

## 2018-01-12 PROCEDURE — 4040F PNEUMOC VAC/ADMIN/RCVD: CPT | Performed by: INTERNAL MEDICINE

## 2018-01-12 PROCEDURE — 36415 COLL VENOUS BLD VENIPUNCTURE: CPT

## 2018-01-12 PROCEDURE — G8427 DOCREV CUR MEDS BY ELIG CLIN: HCPCS | Performed by: INTERNAL MEDICINE

## 2018-01-12 PROCEDURE — 85025 COMPLETE CBC W/AUTO DIFF WBC: CPT

## 2018-01-12 PROCEDURE — 1123F ACP DISCUSS/DSCN MKR DOCD: CPT | Performed by: INTERNAL MEDICINE

## 2018-01-12 PROCEDURE — G8420 CALC BMI NORM PARAMETERS: HCPCS | Performed by: INTERNAL MEDICINE

## 2018-01-12 PROCEDURE — 4004F PT TOBACCO SCREEN RCVD TLK: CPT | Performed by: INTERNAL MEDICINE

## 2018-01-12 PROCEDURE — 83615 LACTATE (LD) (LDH) ENZYME: CPT

## 2018-01-12 PROCEDURE — 99214 OFFICE O/P EST MOD 30 MIN: CPT | Performed by: INTERNAL MEDICINE

## 2018-01-12 RX ORDER — LEVOFLOXACIN 500 MG/1
500 TABLET, FILM COATED ORAL DAILY
Qty: 10 TABLET | Refills: 0 | Status: SHIPPED | OUTPATIENT
Start: 2018-01-12 | End: 2018-01-22

## 2018-01-12 RX ORDER — ATORVASTATIN CALCIUM 20 MG/1
20 TABLET, FILM COATED ORAL DAILY
Qty: 30 TABLET | Refills: 3 | Status: SHIPPED | OUTPATIENT
Start: 2018-01-12 | End: 2018-05-18 | Stop reason: SDUPTHER

## 2018-01-12 RX ORDER — OMEPRAZOLE 40 MG/1
CAPSULE, DELAYED RELEASE ORAL
Qty: 30 CAPSULE | Refills: 5 | Status: SHIPPED | OUTPATIENT
Start: 2018-01-12 | End: 2018-08-22 | Stop reason: SDUPTHER

## 2018-01-12 ASSESSMENT — PATIENT HEALTH QUESTIONNAIRE - PHQ9
1. LITTLE INTEREST OR PLEASURE IN DOING THINGS: 0
SUM OF ALL RESPONSES TO PHQ QUESTIONS 1-9: 0
SUM OF ALL RESPONSES TO PHQ9 QUESTIONS 1 & 2: 0
2. FEELING DOWN, DEPRESSED OR HOPELESS: 0

## 2018-01-12 NOTE — PROGRESS NOTES
test  01/10/2019    Lipid screen  01/10/2019    Colon cancer screen colonoscopy  04/08/2019    Zostavax vaccine  Completed    Flu vaccine  Completed
any evidence of for any bacterial infection other than the elevated white count  Review in 1 month           1.  Phoebe Van received counseling on the following healthy behaviors: nutrition and exercise    2. Prior labs and health maintenance reviewed. 3.  Discussed use, benefit, and side effects of prescribed medications. Barriers to medication compliance addressed. All his questions were answered. Pt voiced understanding. Phoebe Van will continue current medications, diet and exercise. Orders Placed This Encounter   Medications    atorvastatin (LIPITOR) 20 MG tablet     Sig: Take 1 tablet by mouth daily     Dispense:  30 tablet     Refill:  3          Completed Refills               Requested Prescriptions     Signed Prescriptions Disp Refills    atorvastatin (LIPITOR) 20 MG tablet 30 tablet 3     Sig: Take 1 tablet by mouth daily     4. Patient given educational materials - see patient instructions    5. Was a self-tracking handout given in paper form or via [employer]? NO    Orders Placed This Encounter   Procedures    XR CHEST STANDARD (2 VW)     Standing Status:   Future     Standing Expiration Date:   1/12/2019     Order Specific Question:   Reason for exam:     Answer:   cough    CBC With Auto Differential     Standing Status:   Future     Standing Expiration Date:   1/12/2019    Lactate Dehydrogenase     Standing Status:   Future     Standing Expiration Date:   1/12/2019     No Follow-up on file. Patient voiced understanding and agreed to treatment plan. Electronically signed by Cheyanne Braun MD on 1/12/2018 at 10:55 AM    This note is created with a voice recognition program and while intend to generate a document that accurately reflects the content of the visit, no guarantee can be provided that every mistake has been identified and corrected by editing.

## 2018-01-18 ENCOUNTER — HOSPITAL ENCOUNTER (OUTPATIENT)
Age: 68
Setting detail: SPECIMEN
Discharge: HOME | End: 2018-01-18
Payer: MEDICARE

## 2018-01-18 DIAGNOSIS — D72.829 LEUKOCYTOSIS, UNSPECIFIED TYPE: ICD-10-CM

## 2018-01-18 LAB
HCT VFR BLD CALC: 38.1 % (ref 40.7–50.3)
HEMOGLOBIN: 11.9 G/DL (ref 13–17)
MCH RBC QN AUTO: 30.4 PG (ref 25.2–33.5)
MCHC RBC AUTO-ENTMCNC: 31.2 G/DL (ref 28.4–34.8)
MCV RBC AUTO: 97.2 FL (ref 82.6–102.9)
NRBC AUTOMATED: 0 PER 100 WBC
PDW BLD-RTO: 12.8 % (ref 11.8–14.4)
PLATELET # BLD: 895 K/UL (ref 138–453)
PMV BLD AUTO: 10.1 FL (ref 8.1–13.5)
RBC # BLD: 3.92 M/UL (ref 4.21–5.77)
WBC # BLD: 19.9 K/UL (ref 3.5–11.3)

## 2018-01-19 ENCOUNTER — TELEPHONE (OUTPATIENT)
Dept: INTERNAL MEDICINE CLINIC | Age: 68
End: 2018-01-19

## 2018-01-19 DIAGNOSIS — D72.829 LEUKOCYTOSIS, UNSPECIFIED TYPE: Primary | ICD-10-CM

## 2018-01-19 DIAGNOSIS — D75.839 THROMBOCYTHEMIA: ICD-10-CM

## 2018-01-19 NOTE — TELEPHONE ENCOUNTER
Refer to Hematology and Oncology Dr. Ney Washington regarding thrombocythemia and elevated white count

## 2018-01-30 ENCOUNTER — INITIAL CONSULT (OUTPATIENT)
Dept: ONCOLOGY | Age: 68
End: 2018-01-30
Payer: MEDICARE

## 2018-01-30 ENCOUNTER — HOSPITAL ENCOUNTER (OUTPATIENT)
Age: 68
Discharge: HOME | End: 2018-01-30
Payer: MEDICARE

## 2018-01-30 VITALS
SYSTOLIC BLOOD PRESSURE: 154 MMHG | WEIGHT: 141.4 LBS | HEIGHT: 67 IN | BODY MASS INDEX: 22.19 KG/M2 | HEART RATE: 94 BPM | RESPIRATION RATE: 16 BRPM | DIASTOLIC BLOOD PRESSURE: 78 MMHG

## 2018-01-30 DIAGNOSIS — D47.1 MPN (MYELOPROLIFERATIVE NEOPLASM) (HCC): ICD-10-CM

## 2018-01-30 LAB
ABSOLUTE EOS #: 0.5 K/UL (ref 0–0.4)
ABSOLUTE IMMATURE GRANULOCYTE: ABNORMAL K/UL (ref 0–0.3)
ABSOLUTE LYMPH #: 3.5 K/UL (ref 1–4.8)
ABSOLUTE MONO #: 1.4 K/UL (ref 0.1–1.2)
ABSOLUTE RETIC #: 0.08 M/UL (ref 0.03–0.08)
ALBUMIN SERPL-MCNC: 4.2 G/DL (ref 3.5–5.2)
ALBUMIN/GLOBULIN RATIO: 1.3 (ref 1–2.5)
ALP BLD-CCNC: 103 U/L (ref 40–129)
ALT SERPL-CCNC: 27 U/L (ref 5–41)
ANION GAP SERPL CALCULATED.3IONS-SCNC: 15 MMOL/L (ref 9–17)
AST SERPL-CCNC: 27 U/L
BASOPHILS # BLD: 1 % (ref 0–2)
BASOPHILS ABSOLUTE: 0.1 K/UL (ref 0–0.2)
BILIRUB SERPL-MCNC: 0.25 MG/DL (ref 0.3–1.2)
BUN BLDV-MCNC: 11 MG/DL (ref 8–23)
BUN/CREAT BLD: ABNORMAL (ref 9–20)
CALCIUM SERPL-MCNC: 10 MG/DL (ref 8.6–10.4)
CHLORIDE BLD-SCNC: 102 MMOL/L (ref 98–107)
CO2: 25 MMOL/L (ref 20–31)
CREAT SERPL-MCNC: 0.8 MG/DL (ref 0.7–1.2)
DIFFERENTIAL TYPE: ABNORMAL
EOSINOPHILS RELATIVE PERCENT: 3 % (ref 1–4)
FOLATE: >20 NG/ML
GFR AFRICAN AMERICAN: >60 ML/MIN
GFR NON-AFRICAN AMERICAN: >60 ML/MIN
GFR SERPL CREATININE-BSD FRML MDRD: ABNORMAL ML/MIN/{1.73_M2}
GFR SERPL CREATININE-BSD FRML MDRD: ABNORMAL ML/MIN/{1.73_M2}
GLUCOSE BLD-MCNC: 120 MG/DL (ref 70–99)
HCT VFR BLD CALC: 39.4 % (ref 41–53)
HEMOGLOBIN: 13.3 G/DL (ref 13.5–17.5)
IGA: 249 MG/DL (ref 70–400)
IGG: 795 MG/DL (ref 700–1600)
IGM: 69 MG/DL (ref 40–230)
IMMATURE GRANULOCYTES: ABNORMAL %
IMMATURE RETIC FRACT: NORMAL %
LACTATE DEHYDROGENASE: 173 U/L (ref 135–225)
LYMPHOCYTES # BLD: 21 % (ref 24–44)
MCH RBC QN AUTO: 31.8 PG (ref 26–34)
MCHC RBC AUTO-ENTMCNC: 33.7 G/DL (ref 31–37)
MCV RBC AUTO: 94.4 FL (ref 80–100)
MONOCYTES # BLD: 8 % (ref 2–11)
NRBC AUTOMATED: ABNORMAL PER 100 WBC
PDW BLD-RTO: 13.7 % (ref 12.5–15.4)
PLATELET # BLD: 706 K/UL (ref 140–450)
PLATELET ESTIMATE: ABNORMAL
PMV BLD AUTO: 7.9 FL (ref 6–12)
POTASSIUM SERPL-SCNC: 5 MMOL/L (ref 3.7–5.3)
RBC # BLD: 4.18 M/UL (ref 4.5–5.9)
RBC # BLD: ABNORMAL 10*6/UL
RETIC %: 1.9 % (ref 0.5–1.9)
RETIC HEMOGLOBIN: 34.2 PG (ref 28.2–35.7)
SEG NEUTROPHILS: 67 % (ref 36–66)
SEGMENTED NEUTROPHILS ABSOLUTE COUNT: 11.1 K/UL (ref 1.8–7.7)
SODIUM BLD-SCNC: 142 MMOL/L (ref 135–144)
TOTAL PROTEIN: 7.5 G/DL (ref 6.4–8.3)
URIC ACID: 5.4 MG/DL (ref 3.4–7)
VITAMIN B-12: 606 PG/ML (ref 232–1245)
WBC # BLD: 16.6 K/UL (ref 3.5–11)
WBC # BLD: ABNORMAL 10*3/UL

## 2018-01-30 PROCEDURE — 84550 ASSAY OF BLOOD/URIC ACID: CPT

## 2018-01-30 PROCEDURE — 1123F ACP DISCUSS/DSCN MKR DOCD: CPT | Performed by: INTERNAL MEDICINE

## 2018-01-30 PROCEDURE — G8484 FLU IMMUNIZE NO ADMIN: HCPCS | Performed by: INTERNAL MEDICINE

## 2018-01-30 PROCEDURE — 36415 COLL VENOUS BLD VENIPUNCTURE: CPT

## 2018-01-30 PROCEDURE — 86334 IMMUNOFIX E-PHORESIS SERUM: CPT

## 2018-01-30 PROCEDURE — 83615 LACTATE (LD) (LDH) ENZYME: CPT

## 2018-01-30 PROCEDURE — 85025 COMPLETE CBC W/AUTO DIFF WBC: CPT

## 2018-01-30 PROCEDURE — 80053 COMPREHEN METABOLIC PANEL: CPT

## 2018-01-30 PROCEDURE — 81206 BCR/ABL1 GENE MAJOR BP: CPT

## 2018-01-30 PROCEDURE — 82746 ASSAY OF FOLIC ACID SERUM: CPT

## 2018-01-30 PROCEDURE — 81270 JAK2 GENE: CPT

## 2018-01-30 PROCEDURE — 99201 HC NEW PT, E/M LEVEL 1: CPT

## 2018-01-30 PROCEDURE — 85045 AUTOMATED RETICULOCYTE COUNT: CPT

## 2018-01-30 PROCEDURE — G8427 DOCREV CUR MEDS BY ELIG CLIN: HCPCS | Performed by: INTERNAL MEDICINE

## 2018-01-30 PROCEDURE — 3017F COLORECTAL CA SCREEN DOC REV: CPT | Performed by: INTERNAL MEDICINE

## 2018-01-30 PROCEDURE — 82607 VITAMIN B-12: CPT

## 2018-01-30 PROCEDURE — 99204 OFFICE O/P NEW MOD 45 MIN: CPT | Performed by: INTERNAL MEDICINE

## 2018-01-30 PROCEDURE — 4004F PT TOBACCO SCREEN RCVD TLK: CPT | Performed by: INTERNAL MEDICINE

## 2018-01-30 PROCEDURE — 82784 ASSAY IGA/IGD/IGG/IGM EACH: CPT

## 2018-01-30 PROCEDURE — G8420 CALC BMI NORM PARAMETERS: HCPCS | Performed by: INTERNAL MEDICINE

## 2018-01-30 PROCEDURE — 4040F PNEUMOC VAC/ADMIN/RCVD: CPT | Performed by: INTERNAL MEDICINE

## 2018-01-30 NOTE — PROGRESS NOTES
(GLUCOPHAGE) 500 MG tablet TAKE ONE TAB 2 TIMES A  tablet 1    desonide (DESOWEN) 0.05 % cream Apply topically 2 times daily. 1 Tube 0    hydrocortisone-pramoxine (PROCTOFOAM HC) 1-1 % rectal foam Place rectally 2 times daily. 1 Can 0    DiphenhydrAMINE HCl (ALLERGY MED PO) Take 1 tablet by mouth daily      acetaminophen (TYLENOL) 500 MG tablet Take 500 mg by mouth every 6 hours as needed for Pain      Phenylephrine-DM-GG (TUSSIN CF COUGH & COLD PO) Take by mouth as needed       No current facility-administered medications for this visit. SUBJECTIVE:  Tab Valdez is a very pleasant 69 y.o. 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     ROS:  General: negative for fatigue, fever or night sweats  ENT: negative for headaches, hearing change, oral lesions or visual changes  Hematological and Lymphatic: negative for bleeding problems, blood clots, bruising, fatigue, night sweats or weight loss  Respiratory: negative for cough, shortness of breath or tachypnea  Cardiovascular: negative for chest pain, dyspnea on exertion, edema or paroxysmal nocturnal dyspnea  Gastrointestinal: negative for abdominal pain, appetite loss, change in bowel habits, constipation, diarrhea, melena or nausea/vomiting  Genito-Urinary: negative for dysuria, hematuria or incontinence  Musculoskeletal: negative for gait disturbance, joint pain, joint swelling or muscle pain  Neurological: negative for confusion, dizziness, headaches, seizures or tremors  Dermatological: negative for pruritus or rash      PHYSICAL EXAM:   Vitals: BP (!) 154/78 (Site: Left Arm, Position: Sitting, Cuff Size: Medium Adult)   Pulse 94   Resp 16   Ht 5' 7\" (1.702 m)   Wt 141 lb 6.4 oz (64.1 kg)   BMI 22.15 kg/m²   General appearance: alert, appears stated age and cooperative  Skin: Skin color, texture, turgor normal. No rashes or lesions  HEENT: Head: Normocephalic, no lesions, without obvious

## 2018-01-31 LAB — SURGICAL PATHOLOGY REPORT: NORMAL

## 2018-02-01 LAB
PATHOLOGIST REVIEW: NORMAL
PATHOLOGIST: NORMAL
SERUM IFX INTERP: NORMAL

## 2018-02-03 LAB
BCR-ABL QUANTITATIVE: NOT DETECTED
BCR-ABL1, MAJOR, RATIO: 0
BCR-ABL1, PERCENT: 0 %
BCR/ABL SOURCE: NORMAL
EER BCR-ABL1, MAJOR: NORMAL

## 2018-02-06 DIAGNOSIS — D47.1 MPN (MYELOPROLIFERATIVE NEOPLASM) (HCC): Primary | ICD-10-CM

## 2018-02-07 LAB — V617F MUTATION, QNT: 0 %

## 2018-02-20 ENCOUNTER — OFFICE VISIT (OUTPATIENT)
Dept: ONCOLOGY | Age: 68
End: 2018-02-20
Payer: MEDICARE

## 2018-02-20 VITALS
DIASTOLIC BLOOD PRESSURE: 85 MMHG | BODY MASS INDEX: 22.82 KG/M2 | SYSTOLIC BLOOD PRESSURE: 150 MMHG | HEART RATE: 101 BPM | TEMPERATURE: 97.6 F | WEIGHT: 145.7 LBS

## 2018-02-20 DIAGNOSIS — D47.1 MPN (MYELOPROLIFERATIVE NEOPLASM) (HCC): ICD-10-CM

## 2018-02-20 PROCEDURE — 1123F ACP DISCUSS/DSCN MKR DOCD: CPT | Performed by: INTERNAL MEDICINE

## 2018-02-20 PROCEDURE — G8420 CALC BMI NORM PARAMETERS: HCPCS | Performed by: INTERNAL MEDICINE

## 2018-02-20 PROCEDURE — 4004F PT TOBACCO SCREEN RCVD TLK: CPT | Performed by: INTERNAL MEDICINE

## 2018-02-20 PROCEDURE — 3017F COLORECTAL CA SCREEN DOC REV: CPT | Performed by: INTERNAL MEDICINE

## 2018-02-20 PROCEDURE — 99213 OFFICE O/P EST LOW 20 MIN: CPT | Performed by: INTERNAL MEDICINE

## 2018-02-20 PROCEDURE — 99211 OFF/OP EST MAY X REQ PHY/QHP: CPT

## 2018-02-20 PROCEDURE — G8427 DOCREV CUR MEDS BY ELIG CLIN: HCPCS | Performed by: INTERNAL MEDICINE

## 2018-02-20 PROCEDURE — G8484 FLU IMMUNIZE NO ADMIN: HCPCS | Performed by: INTERNAL MEDICINE

## 2018-02-20 PROCEDURE — 4040F PNEUMOC VAC/ADMIN/RCVD: CPT | Performed by: INTERNAL MEDICINE

## 2018-02-20 NOTE — PROGRESS NOTES
PROGRESS NOTE    PCP: Kay Rivera MD  Referring Provider: No ref. provider found    IMPRESSION:   1. Elevated white count with predominant neutrophils  2. Mild anemia  3. Elevated platelet count  4. All the above strongly suggest a myeloproliferative neoplasm  5. Hard of hearing  6. Prior episodes of viral meningitis cervical spine pain shingles pneumonia  7. Diabetes mellitus  8. Active smoker      INTERVAL HISTORY:   LAVERNE 2 negative  BCR ABL negative. Discussed with the patient and wife the above findings. Also discussed the trend of his blood counts over the last few years  In spite spite of testing being negative he still likely has a myeloproliferative neoplasm. Options to proceed would be monitoring the blood counts every 2 months seen in 6 months, his counts are not dangerous and even be more proved to have a myeloproliferative neoplasm likely no treatments now would be necessary    Second option would be to proceed to a bone marrow examination. Bone marrow may be able to document myeloproliferative neoplasm but at times bone marrows may be negative. Third option would be getting a second opinion of the large [address]. We discussed this with the patient and his wife answered their questions. At the end of the discussion was decided we would monitor his blood counts every 2 months and reevaluate in 6 months. PLAN:     1. CBC every 2 months  2. Revisit in 6 months    Return in about 6 months (around 8/20/2018). VISIT DIAGNOSIS:  The encounter diagnosis was MPN (myeloproliferative neoplasm) ([facility]     PAST MEDICAL HISTORY:      Diagnosis Date    Pneumonia     Shingles     Viral meningitis [facility]       PAST SURGICAL HISTORY:      Procedure Laterality Date    CATARACT REMOVAL Bilateral        CURRENT MEDICATIONS:   Current Outpatient Prescriptions   Medication Sig Dispense Refill    atorvastatin (LIPITOR) 20 MG tablet Take 1 tablet by mouth daily exertion, edema or paroxysmal nocturnal dyspnea  Gastrointestinal: negative for abdominal pain, appetite loss, change in bowel habits, constipation, diarrhea, melena or nausea/vomiting  Genito-Urinary: negative for dysuria, hematuria or incontinence  Musculoskeletal: negative for gait disturbance, joint pain, joint swelling or muscle pain  Neurological: negative for confusion, dizziness, headaches, seizures or tremors  Dermatological: negative for pruritus or rash      PHYSICAL EXAM:   Vitals: BP (!) 150/85   Pulse 101   Temp 97.6 °F (36.4 °C) (Oral)   Wt 145 lb 11.2 oz (66.1 kg)   BMI 22.82 kg/m²   General appearance: alert, appears stated age and cooperative  Skin: Skin color, texture, turgor normal. No rashes or lesions  HEENT: Head: Normocephalic, no lesions, without obvious abnormality.   Neck: no adenopathy  Lungs: clear to auscultation bilaterally  Heart: regular rate and rhythm, S1, S2 normal, no murmur, click, rub or gallop  Abdomen: soft, non-tender; bowel sounds normal; no masses,  no organomegaly  Extremities: extremities normal, atraumatic, no cyanosis or edema  Neurologic: Mental status: Alert, oriented, thought content appropriate      LABS:   Results for orders placed or performed during the hospital encounter of 01/30/18   Jak2 Gene Mutation, Quantitative   Result Value Ref Range    V617F Mutation, Qnt 0.0 %   BCR-ABL Quantitative   Result Value Ref Range    BCR/ABL Source Whole Blood     BCR-ABL Quantitative Not Detected     BCR-ABL1,Major,Ratio 0.18007     BCR-ABL1, Percent 0.0000 %    EER BCR-ABL1, Major See Note    CBC Auto Differential   Result Value Ref Range    WBC 16.6 (H) 3.5 - 11.0 k/uL    RBC 4.18 (L) 4.5 - 5.9 m/uL    Hemoglobin 13.3 (L) 13.5 - 17.5 g/dL    Hematocrit 39.4 (L) 41 - 53 %    MCV 94.4 80 - 100 fL    MCH 31.8 26 - 34 pg    MCHC 33.7 31 - 37 g/dL    RDW 13.7 12.5 - 15.4 %    Platelets 640 (H) 135 - 450 k/uL    MPV 7.9 6.0 - 12.0 fL    NRBC Automated NOT REPORTED per 100

## 2018-03-08 ENCOUNTER — HOSPITAL ENCOUNTER (EMERGENCY)
Age: 68
Discharge: HOME | End: 2018-03-08
Attending: EMERGENCY MEDICINE
Payer: MEDICARE

## 2018-03-08 ENCOUNTER — APPOINTMENT (OUTPATIENT)
Dept: GENERAL RADIOLOGY | Age: 68
End: 2018-03-08
Payer: MEDICARE

## 2018-03-08 VITALS
WEIGHT: 139.55 LBS | DIASTOLIC BLOOD PRESSURE: 80 MMHG | HEART RATE: 103 BPM | OXYGEN SATURATION: 97 % | HEIGHT: 66 IN | BODY MASS INDEX: 22.43 KG/M2 | TEMPERATURE: 98 F | RESPIRATION RATE: 18 BRPM | SYSTOLIC BLOOD PRESSURE: 151 MMHG

## 2018-03-08 DIAGNOSIS — S70.02XA CONTUSION OF LEFT HIP, INITIAL ENCOUNTER: Primary | ICD-10-CM

## 2018-03-08 PROCEDURE — 99283 EMERGENCY DEPT VISIT LOW MDM: CPT

## 2018-03-08 PROCEDURE — 73502 X-RAY EXAM HIP UNI 2-3 VIEWS: CPT

## 2018-03-08 RX ORDER — ACETAMINOPHEN AND CODEINE PHOSPHATE 300; 30 MG/1; MG/1
1 TABLET ORAL EVERY 6 HOURS PRN
Qty: 12 TABLET | Refills: 0 | Status: SHIPPED | OUTPATIENT
Start: 2018-03-08 | End: 2018-03-15

## 2018-03-08 ASSESSMENT — PAIN SCALES - GENERAL: PAINLEVEL_OUTOF10: 10

## 2018-03-08 ASSESSMENT — PAIN DESCRIPTION - LOCATION: LOCATION: HIP

## 2018-03-08 ASSESSMENT — PAIN DESCRIPTION - DESCRIPTORS: DESCRIPTORS: ACHING;SHARP;STABBING

## 2018-03-08 ASSESSMENT — PAIN DESCRIPTION - ORIENTATION: ORIENTATION: LEFT

## 2018-03-08 ASSESSMENT — PAIN DESCRIPTION - PAIN TYPE: TYPE: ACUTE PAIN

## 2018-03-08 ASSESSMENT — ENCOUNTER SYMPTOMS: BACK PAIN: 0

## 2018-03-08 NOTE — ED PROVIDER NOTES
[facility] ED  eMERGENCY dEPARTMENT eNCOUnter      Pt Name: Tab Valdez  MRN: [id number]  [place] 1950  Date of evaluation: 3/8/2018  Provider: Gerald Mabry NP    CHIEF COMPLAINT       Chief Complaint   Patient presents with    Hip Pain     lt hip pain since fall 1 wk ago         HISTORY OF PRESENT ILLNESS  (Location/Symptom, Timing/Onset, Context/Setting, Quality, Duration, Modifying Factors, Severity.)   Tab Valdez is a 69 y.o. male who presents to the emergency department via private auto for left hip pain s/p fall 3/1/18 while taking his trash out. Reports landing on the area. Denies hitting his head and LOC. Denies pain to his head, neck, back. Denies N/T to his extremities. Rates his pain 10/10 at this time. Nursing Notes were reviewed. ALLERGIES     Patient has no known allergies. CURRENT MEDICATIONS       Discharge Medication List as of 3/8/2018  1:52 PM      CONTINUE these medications which have NOT CHANGED    Details   atorvastatin (LIPITOR) 20 MG tablet Take 1 tablet by mouth daily, Disp-30 tablet, R-3Normal      omeprazole (PRILOSEC) 40 MG delayed release capsule TAKE 1 CAPSULE BY MOUTH EVERY DAY, Disp-30 capsule, R-5Normal      metFORMIN (GLUCOPHAGE) 500 MG tablet TAKE ONE TAB 2 TIMES A DAY, Disp-180 tablet, R-1Normal      desonide (DESOWEN) 0.05 % cream Apply topically 2 times daily. , Disp-1 Tube, R-0, Normal      hydrocortisone-pramoxine (PROCTOFOAM HC) 1-1 % rectal foam Place rectally 2 times daily. , Disp-1 Can, R-0, Normal      Phenylephrine-DM-GG (TUSSIN CF COUGH & COLD PO) Take by mouth as needed      DiphenhydrAMINE HCl (ALLERGY MED PO) Take 1 tablet by mouth daily      acetaminophen (TYLENOL) 500 MG tablet Take 500 mg by mouth every 6 hours as needed for Pain             PAST MEDICAL HISTORY         Diagnosis Date    Pneumonia     Shingles     Viral meningitis 1991    [facility]       SURGICAL HISTORY           Procedure Laterality Date    CATARACT REMOVAL Bilateral          FAMILY HISTORY     History reviewed. No pertinent family history. No family status information on file. SOCIAL HISTORY      reports that he has been smoking Cigarettes. He has been smoking about 0.50 packs per day. He has never used smokeless tobacco. He reports that he does not drink alcohol. REVIEW OF SYSTEMS    (2-9 systems for level 4, 10 or more for level 5)     Review of Systems   Constitutional: Negative for chills, diaphoresis, fatigue and fever. Musculoskeletal: Positive for arthralgias and myalgias. Negative for back pain, gait problem and neck pain. Skin: Negative for rash and wound. Neurological: Negative for dizziness, syncope, numbness and headaches. Except as noted above the remainder of the review of systems was reviewed and negative. PHYSICAL EXAM    (up to 7 for level 4, 8 or more for level 5)     ED Triage Vitals [03/08/18 1228]   BP Temp Temp Source Pulse Resp SpO2 Height Weight   (!) 151/80 98 °F (36.7 °C) Oral 103 18 97 % 5' 6\" (1.676 m) 139 lb 8.8 oz (63.3 kg)     Physical Exam   Constitutional: He is oriented to person, place, and time. He appears well-developed and well-nourished. No distress. Eyes: Conjunctivae are normal.   Cardiovascular: Normal rate, regular rhythm and normal heart sounds. Pulmonary/Chest: Effort normal and breath sounds normal. No respiratory distress. He has no wheezes. He has no rales. Musculoskeletal:        Left hip: He exhibits tenderness. He exhibits normal range of motion, no swelling and no deformity. Cervical back: He exhibits no tenderness, no bony tenderness and no pain. Thoracic back: He exhibits no tenderness, no bony tenderness and no pain. Lumbar back: He exhibits no tenderness, no bony tenderness and no pain. Pedal pulses palpable. Neurological: He is alert and oriented to person, place, and time. Skin: Skin is warm and dry. No rash noted. He is not diaphoretic. Psychiatric: He has a normal mood and affect. His behavior is normal.   Vitals reviewed. DIAGNOSTIC RESULTS     RADIOLOGY:   Non-plain film images such as CT, Ultrasound and MRI are read by the radiologist. Susu Woodson radiographic images are visualized and preliminarily interpreted by the emergency physician with the below findings:    Interpretation per the Radiologist below, if available at the time of this note:    Xr Hip 2-3 Vw W Pelvis Left    Result Date: 3/8/2018  EXAMINATION: SINGLE VIEW OF THE PELVIS AND 2 VIEWS LEFT HIP 3/8/2018 1:22 pm COMPARISON: None. HISTORY: ORDERING SYSTEM PROVIDED HISTORY: pain, fall TECHNOLOGIST PROVIDED HISTORY: Reason for exam:->pain, fall Ordering Physician Provided Reason for Exam: Pt c/o pain to left hip. Hx of fall x 1 week ago. Acuity: Acute Type of Exam: Initial FINDINGS: Visualized osseous structures appear mildly demineralized. Pelvic ring inlet appears maintained. No displaced or diastatic pelvic bone fractures. No acute or suspicious osseous lesion identified in the pelvis or left hip. No widening of the pubic symphysis or sacroiliac joints. Joint spaces appear well maintained for patient's age. Soft tissues have a normal radiographic appearance. Suspected osteoporotic changes. No definite radiographic evidence for acute osseous abnormality. If there is concern for nondisplaced fracture, cross-sectional imaging (CT or MR) could be considered for further evaluation. EMERGENCY DEPARTMENT COURSE and DIFFERENTIAL DIAGNOSIS/MDM:   Vitals:    Vitals:    03/08/18 1228   BP: (!) 151/80   Pulse: 103   Resp: 18   Temp: 98 °F (36.7 °C)   TempSrc: Oral   SpO2: 97%   Weight: 139 lb 8.8 oz (63.3 kg)   Height: 5' 6\" (1.676 m)       CLINICAL DECISION MAKING:  The patient presented alert with a nontoxic appearance and was seen in conjunction with Dr. Rufus Crews. Imaging was negative for acute findings. OARRS was reviewed. A prescription was written for tylenol #3.  The patient was advised to not drink alcohol, drive, or operate heavy machinery while taking the tylenol #3. Follow up with pcp, return to ED if condition worsens. FINAL IMPRESSION      1. Contusion of left hip, initial encounter            Problem List  Patient Active Problem List   Diagnosis Code    [facility] D47.1         DISPOSITION/PLAN   DISPOSITION Decision To Discharge 03/08/2018 01:50:59 PM      PATIENT REFERRED TO:   Kevyn Osorio MD  [address]   [facility] [id number]    Schedule an appointment as soon as possible for a visit in 2 days      [facility] ED  [address]  [phone]    If symptoms worsen      DISCHARGE MEDICATIONS:     Discharge Medication List as of 3/8/2018  1:52 PM      START taking these medications    Details   acetaminophen-codeine (TYLENOL/CODEINE #3) 300-30 MG per tablet Take 1 tablet by mouth every 6 hours as needed for Pain (WARNING:  May cause drowsiness.  May impair ability to operate vehicles or machinery.  Do not use in combination with alcohol.) for up to 7 days. , Disp-12 tablet, R-0Print                 (Please note that portions of this note were completed with a voice recognition program.  Efforts were made to edit the dictations but occasionally words are mis-transcribed.)    KJ Reddy NP  03/08/18 5139

## 2018-03-09 ENCOUNTER — CARE COORDINATION (OUTPATIENT)
Dept: CARE COORDINATION | Age: 68
End: 2018-03-09

## 2018-03-21 ENCOUNTER — OFFICE VISIT (OUTPATIENT)
Dept: ORTHOPEDIC SURGERY | Age: 68
End: 2018-03-21
Payer: MEDICARE

## 2018-03-21 VITALS
WEIGHT: 139.2 LBS | DIASTOLIC BLOOD PRESSURE: 59 MMHG | SYSTOLIC BLOOD PRESSURE: 120 MMHG | HEART RATE: 85 BPM | HEIGHT: 66 IN | BODY MASS INDEX: 22.37 KG/M2

## 2018-03-21 DIAGNOSIS — M70.62 GREATER TROCHANTERIC BURSITIS OF LEFT HIP: Primary | ICD-10-CM

## 2018-03-21 PROCEDURE — 20610 DRAIN/INJ JOINT/BURSA W/O US: CPT | Performed by: STUDENT IN AN ORGANIZED HEALTH CARE EDUCATION/TRAINING PROGRAM

## 2018-03-21 PROCEDURE — 99214 OFFICE O/P EST MOD 30 MIN: CPT | Performed by: STUDENT IN AN ORGANIZED HEALTH CARE EDUCATION/TRAINING PROGRAM

## 2018-03-21 RX ORDER — METHYLPREDNISOLONE ACETATE 80 MG/ML
80 INJECTION, SUSPENSION INTRA-ARTICULAR; INTRALESIONAL; INTRAMUSCULAR; SOFT TISSUE ONCE
Status: COMPLETED | OUTPATIENT
Start: 2018-03-21 | End: 2018-03-22

## 2018-03-21 RX ORDER — OMEPRAZOLE 20 MG/1
40 CAPSULE, DELAYED RELEASE ORAL DAILY
COMMUNITY
End: 2018-05-22 | Stop reason: DRUGHIGH

## 2018-03-21 RX ORDER — BUPIVACAINE HYDROCHLORIDE 2.5 MG/ML
2 INJECTION, SOLUTION INFILTRATION; PERINEURAL ONCE
Status: COMPLETED | OUTPATIENT
Start: 2018-03-21 | End: 2018-03-22

## 2018-03-21 NOTE — PROGRESS NOTES
[facility]  [address] [place], [place] [address] [address] Rohan Kulkarni [id number]  Dept: [phone]    Ambulatory Orthopedic Consult      CHIEF COMPLAINT:    Chief Complaint   Patient presents with    Hip Pain     left       HISTORY OF PRESENT ILLNESS:      The patient is a 69 y.o. male who is being seen at the request of  Yoandy Simpson MD for consultation and evaluation of Left hip pain. Patient states he fell around 1 March when he was taking garbage out and lost control of the trashcan causing him to fall. Patient states he landed directly on his left hip. He noted immediate pain in the area, and has had difficulty ambulating since then. Prior to the injury patient states that he never had an issue ambulating, and did not use assistive devices for ambulation. Approximately 1 week after fall he presented to the emergency department for evaluation. Radiographs were done at this time there is no apparent findings fracture. Patient was initially diagnosed with a contusion of the left hip. Since that time the patient states that his hip has not improved, he still utilizing a cane for ambulation. He notes he is unable to lay on the left side without pain, but is able to ambulate with some discomfort. He states he is able to point with one finger, and press on the area where pain occurs. Again denies history of trauma to the hip in the past. Denies fevers, chills, nausea, vomiting, chest pain, shortness of breath, numbness or tingling in the affected leg.       Past Medical History:    Past Medical History:   Diagnosis Date    Pneumonia     Shingles     Viral meningitis 1991 [facility]       Past Surgical History:    Past Surgical History:   Procedure Laterality Date    CATARACT REMOVAL Bilateral        Current Medications:   Current Outpatient Prescriptions   Medication Sig Dispense Refill    omeprazole (PRILOSEC) 20 MG delayed release capsule Take 40 mg by mouth daily      atorvastatin (LIPITOR) 20 Specialty:   Physical Therapy     Number of Visits Requested:   1    CA ARTHROCENTESIS ASPIR&/INJ MAJOR JT/BURSA W/O US          Reviewed Subjective section with patient who did agree and confirmed everything documented. Discussed plan and patient expressed understanding of diagnosis and prognosis with plan as stated. All questions answered.      Jed Correia DO   Orthopedic Surgery Resident PGY-1  [address] [facility]

## 2018-03-22 RX ADMIN — BUPIVACAINE HYDROCHLORIDE 5 MG: 2.5 INJECTION, SOLUTION INFILTRATION; PERINEURAL at 11:06

## 2018-03-22 RX ADMIN — METHYLPREDNISOLONE ACETATE 80 MG: 80 INJECTION, SUSPENSION INTRA-ARTICULAR; INTRALESIONAL; INTRAMUSCULAR; SOFT TISSUE at 11:06

## 2018-05-02 ENCOUNTER — OFFICE VISIT (OUTPATIENT)
Dept: ORTHOPEDIC SURGERY | Age: 68
End: 2018-05-02
Payer: MEDICARE

## 2018-05-02 VITALS — BODY MASS INDEX: 22.36 KG/M2 | HEIGHT: 66 IN | WEIGHT: 139.11 LBS

## 2018-05-02 DIAGNOSIS — S76.012A STRAIN OF GLUTEUS MEDIUS OF LEFT LOWER EXTREMITY, INITIAL ENCOUNTER: Primary | ICD-10-CM

## 2018-05-02 PROCEDURE — 1123F ACP DISCUSS/DSCN MKR DOCD: CPT | Performed by: STUDENT IN AN ORGANIZED HEALTH CARE EDUCATION/TRAINING PROGRAM

## 2018-05-02 PROCEDURE — 3017F COLORECTAL CA SCREEN DOC REV: CPT | Performed by: STUDENT IN AN ORGANIZED HEALTH CARE EDUCATION/TRAINING PROGRAM

## 2018-05-02 PROCEDURE — G8427 DOCREV CUR MEDS BY ELIG CLIN: HCPCS | Performed by: STUDENT IN AN ORGANIZED HEALTH CARE EDUCATION/TRAINING PROGRAM

## 2018-05-02 PROCEDURE — 99213 OFFICE O/P EST LOW 20 MIN: CPT | Performed by: STUDENT IN AN ORGANIZED HEALTH CARE EDUCATION/TRAINING PROGRAM

## 2018-05-02 PROCEDURE — 4004F PT TOBACCO SCREEN RCVD TLK: CPT | Performed by: STUDENT IN AN ORGANIZED HEALTH CARE EDUCATION/TRAINING PROGRAM

## 2018-05-02 PROCEDURE — G8420 CALC BMI NORM PARAMETERS: HCPCS | Performed by: STUDENT IN AN ORGANIZED HEALTH CARE EDUCATION/TRAINING PROGRAM

## 2018-05-02 PROCEDURE — 4040F PNEUMOC VAC/ADMIN/RCVD: CPT | Performed by: STUDENT IN AN ORGANIZED HEALTH CARE EDUCATION/TRAINING PROGRAM

## 2018-05-04 ENCOUNTER — HOSPITAL ENCOUNTER (OUTPATIENT)
Dept: MRI IMAGING | Age: 68
Discharge: HOME | End: 2018-05-06
Payer: MEDICARE

## 2018-05-04 DIAGNOSIS — S76.012A STRAIN OF GLUTEUS MEDIUS OF LEFT LOWER EXTREMITY, INITIAL ENCOUNTER: ICD-10-CM

## 2018-05-04 PROCEDURE — 73721 MRI JNT OF LWR EXTRE W/O DYE: CPT

## 2018-05-16 ENCOUNTER — OFFICE VISIT (OUTPATIENT)
Dept: ORTHOPEDIC SURGERY | Age: 68
End: 2018-05-16
Payer: MEDICARE

## 2018-05-16 VITALS — WEIGHT: 137.2 LBS | HEIGHT: 66 IN | BODY MASS INDEX: 22.05 KG/M2

## 2018-05-16 DIAGNOSIS — M70.62 TROCHANTERIC BURSITIS OF LEFT HIP: Primary | ICD-10-CM

## 2018-05-16 PROBLEM — Z86.010 HISTORY OF COLON POLYPS: Status: ACTIVE | Noted: 2018-04-25

## 2018-05-16 PROBLEM — K62.5 RECTAL BLEEDING: Status: ACTIVE | Noted: 2018-04-25

## 2018-05-16 PROCEDURE — G8420 CALC BMI NORM PARAMETERS: HCPCS | Performed by: ORTHOPAEDIC SURGERY

## 2018-05-16 PROCEDURE — 4004F PT TOBACCO SCREEN RCVD TLK: CPT | Performed by: ORTHOPAEDIC SURGERY

## 2018-05-16 PROCEDURE — G8427 DOCREV CUR MEDS BY ELIG CLIN: HCPCS | Performed by: ORTHOPAEDIC SURGERY

## 2018-05-16 PROCEDURE — 99213 OFFICE O/P EST LOW 20 MIN: CPT | Performed by: ORTHOPAEDIC SURGERY

## 2018-05-16 PROCEDURE — 1123F ACP DISCUSS/DSCN MKR DOCD: CPT | Performed by: ORTHOPAEDIC SURGERY

## 2018-05-16 PROCEDURE — 3017F COLORECTAL CA SCREEN DOC REV: CPT | Performed by: ORTHOPAEDIC SURGERY

## 2018-05-16 PROCEDURE — 4040F PNEUMOC VAC/ADMIN/RCVD: CPT | Performed by: ORTHOPAEDIC SURGERY

## 2018-05-16 ASSESSMENT — ENCOUNTER SYMPTOMS: RESPIRATORY NEGATIVE: 1

## 2018-05-21 ENCOUNTER — TELEPHONE (OUTPATIENT)
Dept: INTERNAL MEDICINE CLINIC | Age: 68
End: 2018-05-21

## 2018-05-21 RX ORDER — ATORVASTATIN CALCIUM 20 MG/1
TABLET, FILM COATED ORAL
Qty: 30 TABLET | Refills: 0 | Status: SHIPPED | OUTPATIENT
Start: 2018-05-21 | End: 2018-06-18 | Stop reason: SDUPTHER

## 2018-05-22 ENCOUNTER — OFFICE VISIT (OUTPATIENT)
Dept: INTERNAL MEDICINE CLINIC | Age: 68
End: 2018-05-22
Payer: MEDICARE

## 2018-05-22 VITALS
TEMPERATURE: 98.4 F | BODY MASS INDEX: 22.24 KG/M2 | OXYGEN SATURATION: 94 % | HEART RATE: 101 BPM | DIASTOLIC BLOOD PRESSURE: 70 MMHG | WEIGHT: 138.4 LBS | HEIGHT: 66 IN | RESPIRATION RATE: 16 BRPM | SYSTOLIC BLOOD PRESSURE: 138 MMHG

## 2018-05-22 DIAGNOSIS — K80.20 CALCULUS OF GALLBLADDER WITHOUT CHOLECYSTITIS WITHOUT OBSTRUCTION: ICD-10-CM

## 2018-05-22 DIAGNOSIS — R07.9 RIGHT-SIDED CHEST PAIN: Primary | ICD-10-CM

## 2018-05-22 DIAGNOSIS — D72.829 LEUKOCYTOSIS, UNSPECIFIED TYPE: ICD-10-CM

## 2018-05-22 PROCEDURE — 3017F COLORECTAL CA SCREEN DOC REV: CPT | Performed by: INTERNAL MEDICINE

## 2018-05-22 PROCEDURE — 1123F ACP DISCUSS/DSCN MKR DOCD: CPT | Performed by: INTERNAL MEDICINE

## 2018-05-22 PROCEDURE — 99214 OFFICE O/P EST MOD 30 MIN: CPT | Performed by: INTERNAL MEDICINE

## 2018-05-22 PROCEDURE — G8420 CALC BMI NORM PARAMETERS: HCPCS | Performed by: INTERNAL MEDICINE

## 2018-05-22 PROCEDURE — 4040F PNEUMOC VAC/ADMIN/RCVD: CPT | Performed by: INTERNAL MEDICINE

## 2018-05-22 PROCEDURE — G8427 DOCREV CUR MEDS BY ELIG CLIN: HCPCS | Performed by: INTERNAL MEDICINE

## 2018-05-22 PROCEDURE — 4004F PT TOBACCO SCREEN RCVD TLK: CPT | Performed by: INTERNAL MEDICINE

## 2018-06-19 RX ORDER — ATORVASTATIN CALCIUM 20 MG/1
TABLET, FILM COATED ORAL
Qty: 30 TABLET | Refills: 3 | Status: SHIPPED | OUTPATIENT
Start: 2018-06-19 | End: 2018-09-25 | Stop reason: SDUPTHER

## 2018-08-15 ENCOUNTER — TELEPHONE (OUTPATIENT)
Dept: ONCOLOGY | Age: 68
End: 2018-08-15

## 2018-08-17 ENCOUNTER — HOSPITAL ENCOUNTER (OUTPATIENT)
Age: 68
Setting detail: SPECIMEN
Discharge: HOME | End: 2018-08-17
Payer: MEDICARE

## 2018-08-17 DIAGNOSIS — D47.1 MPN (MYELOPROLIFERATIVE NEOPLASM) (HCC): ICD-10-CM

## 2018-08-17 LAB
ABSOLUTE EOS #: 0.56 K/UL (ref 0–0.44)
ABSOLUTE IMMATURE GRANULOCYTE: 0.07 K/UL (ref 0–0.3)
ABSOLUTE LYMPH #: 3.4 K/UL (ref 1.1–3.7)
ABSOLUTE MONO #: 1.35 K/UL (ref 0.1–1.2)
BASOPHILS # BLD: 1 % (ref 0–2)
BASOPHILS ABSOLUTE: 0.09 K/UL (ref 0–0.2)
DIFFERENTIAL TYPE: ABNORMAL
EOSINOPHILS RELATIVE PERCENT: 4 % (ref 1–4)
HCT VFR BLD CALC: 41.6 % (ref 40.7–50.3)
HEMOGLOBIN: 13.5 G/DL (ref 13–17)
IMMATURE GRANULOCYTES: 1 %
LYMPHOCYTES # BLD: 26 % (ref 24–43)
MCH RBC QN AUTO: 31.3 PG (ref 25.2–33.5)
MCHC RBC AUTO-ENTMCNC: 32.5 G/DL (ref 28.4–34.8)
MCV RBC AUTO: 96.5 FL (ref 82.6–102.9)
MONOCYTES # BLD: 10 % (ref 3–12)
NRBC AUTOMATED: 0 PER 100 WBC
PDW BLD-RTO: 12.3 % (ref 11.8–14.4)
PLATELET # BLD: 520 K/UL (ref 138–453)
PLATELET ESTIMATE: ABNORMAL
PMV BLD AUTO: 10.4 FL (ref 8.1–13.5)
RBC # BLD: 4.31 M/UL (ref 4.21–5.77)
RBC # BLD: ABNORMAL 10*6/UL
SEG NEUTROPHILS: 58 % (ref 36–65)
SEGMENTED NEUTROPHILS ABSOLUTE COUNT: 7.86 K/UL (ref 1.5–8.1)
WBC # BLD: 13.3 K/UL (ref 3.5–11.3)
WBC # BLD: ABNORMAL 10*3/UL

## 2018-08-21 ENCOUNTER — TELEPHONE (OUTPATIENT)
Dept: ONCOLOGY | Age: 68
End: 2018-08-21

## 2018-08-21 ENCOUNTER — OFFICE VISIT (OUTPATIENT)
Dept: ONCOLOGY | Age: 68
End: 2018-08-21
Payer: MEDICARE

## 2018-08-21 VITALS
HEART RATE: 94 BPM | SYSTOLIC BLOOD PRESSURE: 158 MMHG | RESPIRATION RATE: 16 BRPM | DIASTOLIC BLOOD PRESSURE: 80 MMHG | WEIGHT: 141.2 LBS | TEMPERATURE: 98.7 F | BODY MASS INDEX: 22.8 KG/M2

## 2018-08-21 DIAGNOSIS — D47.1 MPN (MYELOPROLIFERATIVE NEOPLASM) (HCC): ICD-10-CM

## 2018-08-21 PROCEDURE — 3017F COLORECTAL CA SCREEN DOC REV: CPT | Performed by: INTERNAL MEDICINE

## 2018-08-21 PROCEDURE — 99213 OFFICE O/P EST LOW 20 MIN: CPT | Performed by: INTERNAL MEDICINE

## 2018-08-21 PROCEDURE — 99211 OFF/OP EST MAY X REQ PHY/QHP: CPT | Performed by: INTERNAL MEDICINE

## 2018-08-21 PROCEDURE — 4040F PNEUMOC VAC/ADMIN/RCVD: CPT | Performed by: INTERNAL MEDICINE

## 2018-08-21 PROCEDURE — G8427 DOCREV CUR MEDS BY ELIG CLIN: HCPCS | Performed by: INTERNAL MEDICINE

## 2018-08-21 PROCEDURE — G8420 CALC BMI NORM PARAMETERS: HCPCS | Performed by: INTERNAL MEDICINE

## 2018-08-21 PROCEDURE — 1101F PT FALLS ASSESS-DOCD LE1/YR: CPT | Performed by: INTERNAL MEDICINE

## 2018-08-21 PROCEDURE — 1123F ACP DISCUSS/DSCN MKR DOCD: CPT | Performed by: INTERNAL MEDICINE

## 2018-08-21 PROCEDURE — 4004F PT TOBACCO SCREEN RCVD TLK: CPT | Performed by: INTERNAL MEDICINE

## 2018-08-21 NOTE — PROGRESS NOTES
PROGRESS NOTE    PCP: Zeus Ward MD  Referring Provider: No ref. provider found    IMPRESSION:   1.  Elevated white count with predominant neutrophils  2.  Mild anemia  3.  Elevated platelet count  4.  All the above strongly suggest a myeloproliferative neoplasm  5.  Hard of hearing  6.  Prior episodes of viral meningitis cervical spine pain shingles pneumonia  7.  Diabetes mellitus  8.  Active smoker      INTERVAL HISTORY:   Since last visit has had an uneventful time. No frequent infections no bruising or bleeding    Repeat CBC prior to this admission, white count is still elevated and platlet count is still elevated but not to the level that was seen earlier  this year    PLAN:     1. Continue to monitor blood counts  2. Recheck counts in 3 months  3. Recheck herCN office in 6 months    Return in about 6 months (around 2/21/2019). VISIT DIAGNOSIS:  The encounter diagnosis was MPN ([facility]eloproliferative neoplasm) ([facility] PAST MEDICAL HISTORY:      Diagnosis Date    Pneumonia     Shingles     [facility]al meningitis 1991    [facility]       PAST SURGICAL HISTORY:      Procedure Laterality Date    CATARACT REMOVAL Bilateral        CURRENT MEDICATIONS:   Current Outpatient Prescriptions   Medication Sig Dispense Refill    atorvastatin (LIPITOR) 20 MG tablet TAKE 1 TABLET BY MOUTH DAILY TAKE IN THE EVENING 30 tablet 3    PROCTOZONE-HC 2.5 % rectal cream   5    omeprazole (PRILOSEC) 40 MG delayed release capsule TAKE 1 CAPSULE BY MOUTH EVERY DAY 30 capsule 5    metFORMIN (GLUCOPHAGE) 500 MG tablet TAKE ONE TAB 2 TIMES A  tablet 1    desonide (DESOWEN) 0.05 % cream Apply topically 2 times daily.  1 Tube 0    Phenylephrine-DM-GG (TUSSIN CF COUGH & COLD PO) Take by mouth as needed      DiphenhydrAMINE HCl (ALLERGY MED PO) Take 1 tablet by mouth daily      acetaminophen (TYLENOL) 500 MG tablet Take 500 mg by mouth every 6 hours as needed for Pain       No current facility-administered medications for this visit. SUBJECTIVE:  Sanam Pena is a very pleasant 69 y.o. male who has an elevated white count and elevated platelet count.   Patient denies any symptoms.  No change in weight no fevers no chills no night sweats.  Active and doing normal activities. Rolan Nogueira is not aware of having abnormalities in his blood counts in the past.     Reviewing the electronic health record shows that his white count is mildly elevated since 2015 and platelet count has been mildly elevated in that same time frame.     He has been relatively healthy denies any serious medical issues he is diabetic and he is an active smoker. Rolan Nogueira is not interested in quitting smoking. Francisca Lowery is retired from [employer] after retired from [employer] he worked in a variety of breast jaundice.  Surgeries include cataract surgery and a tonsillectomy when he was a child he denies any medication allergies    ROS:  General: negative for fatigue, fever or night sweats  ENT: negative for headaches, hearing change, oral lesions or visual changes  Hematological and Lymphatic: negative for bleeding problems, blood clots, bruising, fatigue, night sweats or weight loss  Respiratory: negative for cough, shortness of breath or tachypnea  Cardiovascular: negative for chest pain, dyspnea on exertion, edema or paroxysmal nocturnal dyspnea  Gastrointestinal: negative for abdominal pain, appetite loss, change in bowel habits, constipation, diarrhea, melena or nausea/vomiting  Genito-Urinary: negative for dysuria, hematuria or incontinence  Musculoskeletal: negative for gait disturbance, joint pain, joint swelling or muscle pain  Neurological: negative for confusion, dizziness, headaches, seizures or tremors  Dermatological: negative for pruritus or rash      PHYSICAL EXAM:   Vitals: BP (!) 158/80   Pulse 94   Temp 98.7 °F (37.1 °C) (Tympanic)   Resp 16   Wt 141 lb 3.2 oz (64 kg)   BMI 22.80 kg/m²   General appearance: alert, appears stated age and

## 2018-08-21 NOTE — TELEPHONE ENCOUNTER
Pt given orders for labs to be drawn in Nov. '18 and again at time of RV in Feb. '19. AVS put in file for pt to be called in Jan. '19 and follow up made in Feb. '19 with Dr. Chris Benjamin.

## 2018-08-22 RX ORDER — OMEPRAZOLE 40 MG/1
CAPSULE, DELAYED RELEASE ORAL
Qty: 30 CAPSULE | Refills: 1 | Status: SHIPPED | OUTPATIENT
Start: 2018-08-22 | End: 2018-09-25 | Stop reason: SDUPTHER

## 2018-09-21 PROBLEM — E11.9 TYPE 2 DIABETES MELLITUS WITHOUT COMPLICATION, WITHOUT LONG-TERM CURRENT USE OF INSULIN (HCC): Status: ACTIVE | Noted: 2018-09-21

## 2018-09-25 ENCOUNTER — OFFICE VISIT (OUTPATIENT)
Dept: INTERNAL MEDICINE CLINIC | Age: 68
End: 2018-09-25
Payer: MEDICARE

## 2018-09-25 VITALS
RESPIRATION RATE: 15 BRPM | BODY MASS INDEX: 22.38 KG/M2 | OXYGEN SATURATION: 96 % | HEART RATE: 74 BPM | SYSTOLIC BLOOD PRESSURE: 152 MMHG | DIASTOLIC BLOOD PRESSURE: 78 MMHG | TEMPERATURE: 98 F | WEIGHT: 142.6 LBS | HEIGHT: 67 IN

## 2018-09-25 DIAGNOSIS — E11.9 TYPE 2 DIABETES MELLITUS WITHOUT COMPLICATION, WITHOUT LONG-TERM CURRENT USE OF INSULIN (HCC): Primary | ICD-10-CM

## 2018-09-25 DIAGNOSIS — E78.00 HIGH CHOLESTEROL: ICD-10-CM

## 2018-09-25 LAB — HBA1C MFR BLD: 6.4 %

## 2018-09-25 PROCEDURE — 4004F PT TOBACCO SCREEN RCVD TLK: CPT | Performed by: INTERNAL MEDICINE

## 2018-09-25 PROCEDURE — G8420 CALC BMI NORM PARAMETERS: HCPCS | Performed by: INTERNAL MEDICINE

## 2018-09-25 PROCEDURE — 1101F PT FALLS ASSESS-DOCD LE1/YR: CPT | Performed by: INTERNAL MEDICINE

## 2018-09-25 PROCEDURE — 3017F COLORECTAL CA SCREEN DOC REV: CPT | Performed by: INTERNAL MEDICINE

## 2018-09-25 PROCEDURE — 2022F DILAT RTA XM EVC RTNOPTHY: CPT | Performed by: INTERNAL MEDICINE

## 2018-09-25 PROCEDURE — 3044F HG A1C LEVEL LT 7.0%: CPT | Performed by: INTERNAL MEDICINE

## 2018-09-25 PROCEDURE — 83036 HEMOGLOBIN GLYCOSYLATED A1C: CPT | Performed by: INTERNAL MEDICINE

## 2018-09-25 PROCEDURE — G8427 DOCREV CUR MEDS BY ELIG CLIN: HCPCS | Performed by: INTERNAL MEDICINE

## 2018-09-25 PROCEDURE — 1123F ACP DISCUSS/DSCN MKR DOCD: CPT | Performed by: INTERNAL MEDICINE

## 2018-09-25 PROCEDURE — 99213 OFFICE O/P EST LOW 20 MIN: CPT | Performed by: INTERNAL MEDICINE

## 2018-09-25 PROCEDURE — 4040F PNEUMOC VAC/ADMIN/RCVD: CPT | Performed by: INTERNAL MEDICINE

## 2018-09-25 RX ORDER — PERMETHRIN 50 MG/G
CREAM TOPICAL
COMMUNITY
End: 2020-01-08

## 2018-09-25 RX ORDER — ATORVASTATIN CALCIUM 20 MG/1
TABLET, FILM COATED ORAL
Qty: 30 TABLET | Refills: 5 | Status: SHIPPED | OUTPATIENT
Start: 2018-09-25 | End: 2019-04-04 | Stop reason: SDUPTHER

## 2018-09-25 RX ORDER — OMEPRAZOLE 40 MG/1
CAPSULE, DELAYED RELEASE ORAL
Qty: 30 CAPSULE | Refills: 5 | Status: SHIPPED | OUTPATIENT
Start: 2018-09-25 | End: 2019-04-04 | Stop reason: SDUPTHER

## 2018-09-25 NOTE — PROGRESS NOTES
Julianna Amaro is a 69 y.o. male who presents for   Chief Complaint   Patient presents with   Bud Hightower     pt is here for his 4 month check on DM - pt voices no new concerns    Diabetes     [facility]     Pt said he was going to wait until a later date for the flu vaccine - due for pneumo vaccine    and follow up of chronic medical problems. Patient Active Problem List   Diagnosis    [facility]rol[facility]) ([facility]    History of colon polyps    Rectal bleeding    Type 2 diabetes mellitus without complication, without long-term current use of insulin ([facility])     HPI  Here for follow-up on  diabetes denies any new complaints    Current Outpatient Prescriptions   Medication Sig Dispense Refill    omeprazole (PRILOSEC) 40 MG delayed release capsule TAKE 1 CAPSULE BY MOUTH EVERY DAY 30 capsule 5    metFORMIN (GLUCOPHAGE) 500 MG tablet TAKE ONE TAB 2 TIMES A DAY 60 tablet 5    atorvastatin (LIPITOR) 20 MG tablet TAKE 1 TABLET BY MOUTH DAILY TAKE IN THE EVENING 30 tablet 5    desonide (DESOWEN) 0.05 % cream Apply topically 2 times daily. 1 Tube 0    DiphenhydrAMINE HCl (ALLERGY MED PO) Take 1 tablet by mouth daily      acetaminophen (TYLENOL) 500 MG tablet Take 500 mg by mouth every 6 hours as needed for Pain      permethrin (ELIMITE) 5 % cream Apply topically      PROCTOZONE-HC 2.5 % rectal cream   5    Phenylephrine-DM-GG (TUSSIN CF COUGH & COLD PO) Take by mouth as needed       No current facility-administered medications for this visit. No Known Allergies    Past Medical History:   Diagnosis Date    Pneumonia     Shingles     Viral meningitis 1991 [facility]       Past Surgical History:   Procedure Laterality Date    CATARACT REMOVAL Bilateral        No family history on file.   ROS   Constitutional:  Negative for fatigue, loss of appetite and unexpected weight change   HEENT            : Negative for neck stiffness and pain, no congestion or sinus document that accurately reflects the content of the visit, no guarantee can be provided that every mistake has been identified and corrected by editing.

## 2019-01-18 ENCOUNTER — HOSPITAL ENCOUNTER (OUTPATIENT)
Age: 69
Setting detail: SPECIMEN
Discharge: HOME | End: 2019-01-18
Payer: MEDICARE

## 2019-01-18 DIAGNOSIS — E78.00 HIGH CHOLESTEROL: ICD-10-CM

## 2019-01-18 DIAGNOSIS — E11.9 TYPE 2 DIABETES MELLITUS WITHOUT COMPLICATION, WITHOUT LONG-TERM CURRENT USE OF INSULIN (HCC): ICD-10-CM

## 2019-01-18 LAB
ALBUMIN SERPL-MCNC: 4.3 G/DL (ref 3.5–5.2)
ALBUMIN/GLOBULIN RATIO: 1.4 (ref 1–2.5)
ALP BLD-CCNC: 106 U/L (ref 40–129)
ALT SERPL-CCNC: 14 U/L (ref 5–41)
ANION GAP SERPL CALCULATED.3IONS-SCNC: 16 MMOL/L (ref 9–17)
AST SERPL-CCNC: 15 U/L
BILIRUB SERPL-MCNC: 0.22 MG/DL (ref 0.3–1.2)
BUN BLDV-MCNC: 14 MG/DL (ref 8–23)
BUN/CREAT BLD: ABNORMAL (ref 9–20)
CALCIUM SERPL-MCNC: 9.5 MG/DL (ref 8.6–10.4)
CHLORIDE BLD-SCNC: 102 MMOL/L (ref 98–107)
CHOLESTEROL/HDL RATIO: 4.5
CHOLESTEROL: 179 MG/DL
CO2: 24 MMOL/L (ref 20–31)
CREAT SERPL-MCNC: 0.91 MG/DL (ref 0.7–1.2)
CREATININE URINE: 129.6 MG/DL (ref 39–259)
GFR AFRICAN AMERICAN: >60 ML/MIN
GFR NON-AFRICAN AMERICAN: >60 ML/MIN
GFR SERPL CREATININE-BSD FRML MDRD: ABNORMAL ML/MIN/{1.73_M2}
GFR SERPL CREATININE-BSD FRML MDRD: ABNORMAL ML/MIN/{1.73_M2}
GLUCOSE BLD-MCNC: 117 MG/DL (ref 70–99)
HDLC SERPL-MCNC: 40 MG/DL
LDL CHOLESTEROL: 105 MG/DL (ref 0–130)
MICROALBUMIN/CREAT 24H UR: 107 MG/L
MICROALBUMIN/CREAT UR-RTO: 83 MCG/MG CREAT
POTASSIUM SERPL-SCNC: 4.2 MMOL/L (ref 3.7–5.3)
SODIUM BLD-SCNC: 142 MMOL/L (ref 135–144)
TOTAL PROTEIN: 7.3 G/DL (ref 6.4–8.3)
TRIGL SERPL-MCNC: 170 MG/DL
VLDLC SERPL CALC-MCNC: ABNORMAL MG/DL (ref 1–30)

## 2019-01-20 LAB
ESTIMATED AVERAGE GLUCOSE: 131 MG/DL
HBA1C MFR BLD: 6.2 % (ref 4–6)

## 2019-01-22 ENCOUNTER — OFFICE VISIT (OUTPATIENT)
Dept: INTERNAL MEDICINE CLINIC | Age: 69
End: 2019-01-22
Payer: MEDICARE

## 2019-01-22 VITALS
DIASTOLIC BLOOD PRESSURE: 80 MMHG | SYSTOLIC BLOOD PRESSURE: 138 MMHG | BODY MASS INDEX: 22.1 KG/M2 | TEMPERATURE: 98.9 F | WEIGHT: 140.8 LBS | HEIGHT: 67 IN | HEART RATE: 72 BPM

## 2019-01-22 DIAGNOSIS — E11.9 TYPE 2 DIABETES MELLITUS WITHOUT COMPLICATION, WITHOUT LONG-TERM CURRENT USE OF INSULIN (HCC): Primary | ICD-10-CM

## 2019-01-22 DIAGNOSIS — R80.9 MICROALBUMINURIA: ICD-10-CM

## 2019-01-22 DIAGNOSIS — D72.829 LEUKOCYTOSIS, UNSPECIFIED TYPE: ICD-10-CM

## 2019-01-22 DIAGNOSIS — Z23 NEED FOR INFLUENZA VACCINATION: ICD-10-CM

## 2019-01-22 DIAGNOSIS — E78.00 HIGH CHOLESTEROL: ICD-10-CM

## 2019-01-22 DIAGNOSIS — E53.8 LOW VITAMIN B12 LEVEL: ICD-10-CM

## 2019-01-22 DIAGNOSIS — Z97.4 USES HEARING AID: ICD-10-CM

## 2019-01-22 PROCEDURE — 3017F COLORECTAL CA SCREEN DOC REV: CPT | Performed by: INTERNAL MEDICINE

## 2019-01-22 PROCEDURE — G8482 FLU IMMUNIZE ORDER/ADMIN: HCPCS | Performed by: INTERNAL MEDICINE

## 2019-01-22 PROCEDURE — 90662 IIV NO PRSV INCREASED AG IM: CPT | Performed by: INTERNAL MEDICINE

## 2019-01-22 PROCEDURE — 4004F PT TOBACCO SCREEN RCVD TLK: CPT | Performed by: INTERNAL MEDICINE

## 2019-01-22 PROCEDURE — 96372 THER/PROPH/DIAG INJ SC/IM: CPT | Performed by: INTERNAL MEDICINE

## 2019-01-22 PROCEDURE — G8427 DOCREV CUR MEDS BY ELIG CLIN: HCPCS | Performed by: INTERNAL MEDICINE

## 2019-01-22 PROCEDURE — 1101F PT FALLS ASSESS-DOCD LE1/YR: CPT | Performed by: INTERNAL MEDICINE

## 2019-01-22 PROCEDURE — G0008 ADMIN INFLUENZA VIRUS VAC: HCPCS | Performed by: INTERNAL MEDICINE

## 2019-01-22 PROCEDURE — 4040F PNEUMOC VAC/ADMIN/RCVD: CPT | Performed by: INTERNAL MEDICINE

## 2019-01-22 PROCEDURE — 2022F DILAT RTA XM EVC RTNOPTHY: CPT | Performed by: INTERNAL MEDICINE

## 2019-01-22 PROCEDURE — 99214 OFFICE O/P EST MOD 30 MIN: CPT | Performed by: INTERNAL MEDICINE

## 2019-01-22 PROCEDURE — 1123F ACP DISCUSS/DSCN MKR DOCD: CPT | Performed by: INTERNAL MEDICINE

## 2019-01-22 PROCEDURE — G8420 CALC BMI NORM PARAMETERS: HCPCS | Performed by: INTERNAL MEDICINE

## 2019-01-22 PROCEDURE — 3044F HG A1C LEVEL LT 7.0%: CPT | Performed by: INTERNAL MEDICINE

## 2019-01-22 RX ORDER — CHOLECALCIFEROL (VITAMIN D3) 125 MCG
500 CAPSULE ORAL DAILY
Qty: 30 TABLET | Refills: 5 | Status: SHIPPED | OUTPATIENT
Start: 2019-01-22 | End: 2020-01-22

## 2019-01-22 RX ORDER — CYANOCOBALAMIN 1000 UG/ML
1000 INJECTION INTRAMUSCULAR; SUBCUTANEOUS ONCE
Status: COMPLETED | OUTPATIENT
Start: 2019-01-22 | End: 2019-01-22

## 2019-01-22 RX ORDER — LOSARTAN POTASSIUM 25 MG/1
25 TABLET ORAL DAILY
Qty: 90 TABLET | Refills: 1 | Status: SHIPPED | OUTPATIENT
Start: 2019-01-22 | End: 2019-08-15 | Stop reason: SDUPTHER

## 2019-01-22 RX ADMIN — CYANOCOBALAMIN 1000 MCG: 1000 INJECTION INTRAMUSCULAR; SUBCUTANEOUS at 14:49

## 2019-01-22 ASSESSMENT — PATIENT HEALTH QUESTIONNAIRE - PHQ9
SUM OF ALL RESPONSES TO PHQ QUESTIONS 1-9: 0
SUM OF ALL RESPONSES TO PHQ QUESTIONS 1-9: 0
1. LITTLE INTEREST OR PLEASURE IN DOING THINGS: 0
2. FEELING DOWN, DEPRESSED OR HOPELESS: 0
SUM OF ALL RESPONSES TO PHQ9 QUESTIONS 1 & 2: 0

## 2019-02-15 ENCOUNTER — HOSPITAL ENCOUNTER (OUTPATIENT)
Age: 69
Discharge: HOME | End: 2019-02-15
Payer: MEDICARE

## 2019-02-15 DIAGNOSIS — D47.1 MPN (MYELOPROLIFERATIVE NEOPLASM) (HCC): ICD-10-CM

## 2019-02-15 LAB
ABSOLUTE EOS #: 0.7 K/UL (ref 0–0.4)
ABSOLUTE IMMATURE GRANULOCYTE: ABNORMAL K/UL (ref 0–0.3)
ABSOLUTE LYMPH #: 4.2 K/UL (ref 1–4.8)
ABSOLUTE MONO #: 1 K/UL (ref 0.1–1.3)
BASOPHILS # BLD: 1 % (ref 0–2)
BASOPHILS ABSOLUTE: 0.1 K/UL (ref 0–0.2)
DIFFERENTIAL TYPE: ABNORMAL
EOSINOPHILS RELATIVE PERCENT: 5 % (ref 0–4)
HCT VFR BLD CALC: 39.1 % (ref 41–53)
HEMOGLOBIN: 12.9 G/DL (ref 13.5–17.5)
IMMATURE GRANULOCYTES: ABNORMAL %
LYMPHOCYTES # BLD: 29 % (ref 24–44)
MCH RBC QN AUTO: 31.2 PG (ref 26–34)
MCHC RBC AUTO-ENTMCNC: 33 G/DL (ref 31–37)
MCV RBC AUTO: 94.6 FL (ref 80–100)
MONOCYTES # BLD: 7 % (ref 1–7)
NRBC AUTOMATED: ABNORMAL PER 100 WBC
PDW BLD-RTO: 13.6 % (ref 11.5–14.9)
PLATELET # BLD: 506 K/UL (ref 150–450)
PLATELET ESTIMATE: ABNORMAL
PMV BLD AUTO: 8.4 FL (ref 6–12)
RBC # BLD: 4.13 M/UL (ref 4.5–5.9)
RBC # BLD: ABNORMAL 10*6/UL
SEG NEUTROPHILS: 58 % (ref 36–66)
SEGMENTED NEUTROPHILS ABSOLUTE COUNT: 8.4 K/UL (ref 1.3–9.1)
WBC # BLD: 14.4 K/UL (ref 3.5–11)
WBC # BLD: ABNORMAL 10*3/UL

## 2019-02-15 PROCEDURE — 36415 COLL VENOUS BLD VENIPUNCTURE: CPT

## 2019-02-15 PROCEDURE — 85025 COMPLETE CBC W/AUTO DIFF WBC: CPT

## 2019-02-22 ENCOUNTER — OFFICE VISIT (OUTPATIENT)
Dept: ONCOLOGY | Age: 69
End: 2019-02-22
Payer: MEDICARE

## 2019-02-22 ENCOUNTER — TELEPHONE (OUTPATIENT)
Dept: ONCOLOGY | Age: 69
End: 2019-02-22

## 2019-02-22 VITALS
BODY MASS INDEX: 21.98 KG/M2 | TEMPERATURE: 97.9 F | DIASTOLIC BLOOD PRESSURE: 78 MMHG | HEART RATE: 95 BPM | WEIGHT: 140.38 LBS | SYSTOLIC BLOOD PRESSURE: 137 MMHG

## 2019-02-22 DIAGNOSIS — D47.1 MPN (MYELOPROLIFERATIVE NEOPLASM) (HCC): Primary | ICD-10-CM

## 2019-02-22 PROCEDURE — G8427 DOCREV CUR MEDS BY ELIG CLIN: HCPCS | Performed by: INTERNAL MEDICINE

## 2019-02-22 PROCEDURE — 1123F ACP DISCUSS/DSCN MKR DOCD: CPT | Performed by: INTERNAL MEDICINE

## 2019-02-22 PROCEDURE — 99211 OFF/OP EST MAY X REQ PHY/QHP: CPT

## 2019-02-22 PROCEDURE — 99213 OFFICE O/P EST LOW 20 MIN: CPT | Performed by: INTERNAL MEDICINE

## 2019-02-22 PROCEDURE — G8420 CALC BMI NORM PARAMETERS: HCPCS | Performed by: INTERNAL MEDICINE

## 2019-02-22 PROCEDURE — 4004F PT TOBACCO SCREEN RCVD TLK: CPT | Performed by: INTERNAL MEDICINE

## 2019-02-22 PROCEDURE — 4040F PNEUMOC VAC/ADMIN/RCVD: CPT | Performed by: INTERNAL MEDICINE

## 2019-02-22 PROCEDURE — 1101F PT FALLS ASSESS-DOCD LE1/YR: CPT | Performed by: INTERNAL MEDICINE

## 2019-02-22 PROCEDURE — G8482 FLU IMMUNIZE ORDER/ADMIN: HCPCS | Performed by: INTERNAL MEDICINE

## 2019-02-22 PROCEDURE — 3017F COLORECTAL CA SCREEN DOC REV: CPT | Performed by: INTERNAL MEDICINE

## 2019-03-07 ENCOUNTER — OFFICE VISIT (OUTPATIENT)
Dept: INTERNAL MEDICINE CLINIC | Age: 69
End: 2019-03-07
Payer: MEDICARE

## 2019-03-07 VITALS
DIASTOLIC BLOOD PRESSURE: 62 MMHG | HEIGHT: 67 IN | TEMPERATURE: 97.9 F | OXYGEN SATURATION: 98 % | BODY MASS INDEX: 22 KG/M2 | HEART RATE: 96 BPM | WEIGHT: 140.2 LBS | SYSTOLIC BLOOD PRESSURE: 130 MMHG

## 2019-03-07 DIAGNOSIS — E11.21 TYPE 2 DIABETES MELLITUS WITH DIABETIC NEPHROPATHY, WITHOUT LONG-TERM CURRENT USE OF INSULIN (HCC): ICD-10-CM

## 2019-03-07 DIAGNOSIS — Z00.00 ENCOUNTER FOR MEDICARE ANNUAL WELLNESS EXAM: Primary | ICD-10-CM

## 2019-03-07 PROCEDURE — 3044F HG A1C LEVEL LT 7.0%: CPT | Performed by: INTERNAL MEDICINE

## 2019-03-07 PROCEDURE — G8482 FLU IMMUNIZE ORDER/ADMIN: HCPCS | Performed by: INTERNAL MEDICINE

## 2019-03-07 PROCEDURE — G0439 PPPS, SUBSEQ VISIT: HCPCS | Performed by: INTERNAL MEDICINE

## 2019-03-07 PROCEDURE — 4040F PNEUMOC VAC/ADMIN/RCVD: CPT | Performed by: INTERNAL MEDICINE

## 2019-03-07 ASSESSMENT — PATIENT HEALTH QUESTIONNAIRE - PHQ9
SUM OF ALL RESPONSES TO PHQ QUESTIONS 1-9: 0
SUM OF ALL RESPONSES TO PHQ QUESTIONS 1-9: 0

## 2019-03-07 ASSESSMENT — LIFESTYLE VARIABLES: HOW OFTEN DO YOU HAVE A DRINK CONTAINING ALCOHOL: 0

## 2019-03-07 ASSESSMENT — ANXIETY QUESTIONNAIRES: GAD7 TOTAL SCORE: 0

## 2019-04-05 RX ORDER — ATORVASTATIN CALCIUM 20 MG/1
TABLET, FILM COATED ORAL
Qty: 30 TABLET | Refills: 5 | Status: SHIPPED | OUTPATIENT
Start: 2019-04-05 | End: 2019-11-04 | Stop reason: SDUPTHER

## 2019-04-05 RX ORDER — OMEPRAZOLE 40 MG/1
CAPSULE, DELAYED RELEASE ORAL
Qty: 30 CAPSULE | Refills: 5 | Status: SHIPPED | OUTPATIENT
Start: 2019-04-05 | End: 2019-10-07 | Stop reason: SDUPTHER

## 2019-07-08 ENCOUNTER — OFFICE VISIT (OUTPATIENT)
Dept: INTERNAL MEDICINE CLINIC | Age: 69
End: 2019-07-08
Payer: MEDICARE

## 2019-07-08 VITALS
DIASTOLIC BLOOD PRESSURE: 54 MMHG | TEMPERATURE: 98.9 F | HEART RATE: 108 BPM | HEIGHT: 67 IN | SYSTOLIC BLOOD PRESSURE: 100 MMHG | OXYGEN SATURATION: 97 % | BODY MASS INDEX: 20.59 KG/M2 | WEIGHT: 131.2 LBS

## 2019-07-08 DIAGNOSIS — D72.829 LEUKOCYTOSIS, UNSPECIFIED TYPE: ICD-10-CM

## 2019-07-08 DIAGNOSIS — R41.3 MEMORY PROBLEM: ICD-10-CM

## 2019-07-08 DIAGNOSIS — R26.89 SHUFFLING GAIT: ICD-10-CM

## 2019-07-08 DIAGNOSIS — R61 NIGHT SWEATS: ICD-10-CM

## 2019-07-08 DIAGNOSIS — E11.9 TYPE 2 DIABETES MELLITUS WITHOUT COMPLICATION, WITHOUT LONG-TERM CURRENT USE OF INSULIN (HCC): ICD-10-CM

## 2019-07-08 DIAGNOSIS — E11.21 TYPE 2 DIABETES MELLITUS WITH DIABETIC NEPHROPATHY, WITHOUT LONG-TERM CURRENT USE OF INSULIN (HCC): Primary | ICD-10-CM
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
## 2019-07-08 PROCEDURE — G8420 CALC BMI NORM PARAMETERS: HCPCS | Performed by: INTERNAL MEDICINE

## 2019-07-08 PROCEDURE — 1123F ACP DISCUSS/DSCN MKR DOCD: CPT | Performed by: INTERNAL MEDICINE

## 2019-07-08 PROCEDURE — 3044F HG A1C LEVEL LT 7.0%: CPT | Performed by: INTERNAL MEDICINE

## 2019-07-08 PROCEDURE — 99214 OFFICE O/P EST MOD 30 MIN: CPT | Performed by: INTERNAL MEDICINE

## 2019-07-08 PROCEDURE — 4040F PNEUMOC VAC/ADMIN/RCVD: CPT | Performed by: INTERNAL MEDICINE

## 2019-07-08 PROCEDURE — 2022F DILAT RTA XM EVC RTNOPTHY: CPT | Performed by: INTERNAL MEDICINE

## 2019-07-08 PROCEDURE — 4004F PT TOBACCO SCREEN RCVD TLK: CPT | Performed by: INTERNAL MEDICINE

## 2019-07-08 PROCEDURE — G8427 DOCREV CUR MEDS BY ELIG CLIN: HCPCS | Performed by: INTERNAL MEDICINE

## 2019-07-08 PROCEDURE — 3017F COLORECTAL CA SCREEN DOC REV: CPT | Performed by: INTERNAL MEDICINE

## 2019-07-08 NOTE — PROGRESS NOTES
Neurology     Number of Visits Requested:   1     Return in about 6 weeks (around 8/19/2019). Patient voiced understanding and agreed to treatment plan. Electronically signed by Brandon Reyes MD on 7/8/2019 at 3:29 PM    This note is created with a voice recognition program and while intend to generate a document that accurately reflects the content of the visit, no guarantee can be provided that every mistake has been identified and corrected by editing.

## 2019-07-12 ENCOUNTER — HOSPITAL ENCOUNTER (OUTPATIENT)
Age: 69
Setting detail: SPECIMEN
Discharge: HOME | End: 2019-07-12
Payer: MEDICARE

## 2019-07-12 DIAGNOSIS — R41.3 MEMORY PROBLEM: ICD-10-CM

## 2019-07-12 DIAGNOSIS — R26.89 SHUFFLING GAIT: ICD-10-CM

## 2019-07-12 DIAGNOSIS — R61 NIGHT SWEATS: ICD-10-CM
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
## 2019-07-12 DIAGNOSIS — E11.21 TYPE 2 DIABETES MELLITUS WITH DIABETIC NEPHROPATHY, WITHOUT LONG-TERM CURRENT USE OF INSULIN (HCC): ICD-10-CM

## 2019-07-12 DIAGNOSIS — D72.829 LEUKOCYTOSIS, UNSPECIFIED TYPE: ICD-10-CM

## 2019-07-12 LAB
ALBUMIN SERPL-MCNC: 4.6 G/DL (ref 3.5–5.2)
ALBUMIN/GLOBULIN RATIO: 1.4 (ref 1–2.5)
ALP BLD-CCNC: 118 U/L (ref 40–129)
ALT SERPL-CCNC: 21 U/L (ref 5–41)
ANION GAP SERPL CALCULATED.3IONS-SCNC: 20 MMOL/L (ref 9–17)
AST SERPL-CCNC: 19 U/L
BILIRUB SERPL-MCNC: 0.21 MG/DL (ref 0.3–1.2)
BUN BLDV-MCNC: 11 MG/DL (ref 8–23)
BUN/CREAT BLD: ABNORMAL (ref 9–20)
CALCIUM SERPL-MCNC: 10.3 MG/DL (ref 8.6–10.4)
CHLORIDE BLD-SCNC: 102 MMOL/L (ref 98–107)
CO2: 20 MMOL/L (ref 20–31)
CREAT SERPL-MCNC: 0.81 MG/DL (ref 0.7–1.2)
ESTIMATED AVERAGE GLUCOSE: 134 MG/DL
GFR AFRICAN AMERICAN: >60 ML/MIN
GFR NON-AFRICAN AMERICAN: >60 ML/MIN
GFR SERPL CREATININE-BSD FRML MDRD: ABNORMAL ML/MIN/{1.73_M2}
GFR SERPL CREATININE-BSD FRML MDRD: ABNORMAL ML/MIN/{1.73_M2}
GLUCOSE BLD-MCNC: 112 MG/DL (ref 70–99)
HBA1C MFR BLD: 6.3 % (ref 4–6)
POTASSIUM SERPL-SCNC: 4.6 MMOL/L (ref 3.7–5.3)
SEDIMENTATION RATE, ERYTHROCYTE: 10 MM (ref 0–10)
SODIUM BLD-SCNC: 142 MMOL/L (ref 135–144)
TOTAL PROTEIN: 7.8 G/DL (ref 6.4–8.3)
TSH SERPL DL<=0.05 MIU/L-ACNC: 1.37 MIU/L (ref 0.3–5)
VITAMIN B-12: 704 PG/ML (ref 232–1245)

## 2019-08-15 ENCOUNTER — OFFICE VISIT (OUTPATIENT)
Dept: INTERNAL MEDICINE CLINIC | Age: 69
End: 2019-08-15
Payer: MEDICARE

## 2019-08-15 ENCOUNTER — OFFICE VISIT (OUTPATIENT)
Dept: NEUROLOGY | Age: 69
End: 2019-08-15
Payer: MEDICARE

## 2019-08-15 VITALS
WEIGHT: 131.8 LBS | TEMPERATURE: 98.3 F | OXYGEN SATURATION: 93 % | SYSTOLIC BLOOD PRESSURE: 158 MMHG | HEART RATE: 83 BPM | HEIGHT: 67 IN | DIASTOLIC BLOOD PRESSURE: 60 MMHG | BODY MASS INDEX: 20.69 KG/M2

## 2019-08-15 VITALS
WEIGHT: 132.4 LBS | DIASTOLIC BLOOD PRESSURE: 82 MMHG | HEART RATE: 82 BPM | HEIGHT: 67 IN | BODY MASS INDEX: 20.78 KG/M2 | SYSTOLIC BLOOD PRESSURE: 175 MMHG

## 2019-08-15 DIAGNOSIS — R26.2 AMBULATORY DYSFUNCTION: ICD-10-CM

## 2019-08-15 DIAGNOSIS — R41.3 MEMORY LOSS: Primary | ICD-10-CM

## 2019-08-15 DIAGNOSIS — R29.6 FALLS FREQUENTLY: ICD-10-CM

## 2019-08-15 DIAGNOSIS — G62.9 LENGTH-DEPENDENT PERIPHERAL NEUROPATHY: ICD-10-CM

## 2019-08-15 DIAGNOSIS — I10 HYPERTENSION, UNSPECIFIED TYPE: ICD-10-CM

## 2019-08-15 DIAGNOSIS — R41.3 MEMORY PROBLEM: ICD-10-CM

## 2019-08-15 DIAGNOSIS — I10 ESSENTIAL HYPERTENSION: Primary | ICD-10-CM

## 2019-08-15 DIAGNOSIS — R41.0 EPISODE OF CONFUSION: ICD-10-CM

## 2019-08-15 PROCEDURE — 99214 OFFICE O/P EST MOD 30 MIN: CPT | Performed by: INTERNAL MEDICINE

## 2019-08-15 PROCEDURE — 4040F PNEUMOC VAC/ADMIN/RCVD: CPT | Performed by: PSYCHIATRY & NEUROLOGY

## 2019-08-15 PROCEDURE — 3017F COLORECTAL CA SCREEN DOC REV: CPT | Performed by: PSYCHIATRY & NEUROLOGY

## 2019-08-15 PROCEDURE — G8420 CALC BMI NORM PARAMETERS: HCPCS | Performed by: INTERNAL MEDICINE

## 2019-08-15 PROCEDURE — G8420 CALC BMI NORM PARAMETERS: HCPCS | Performed by: PSYCHIATRY & NEUROLOGY

## 2019-08-15 PROCEDURE — G8427 DOCREV CUR MEDS BY ELIG CLIN: HCPCS | Performed by: PSYCHIATRY & NEUROLOGY

## 2019-08-15 PROCEDURE — 4004F PT TOBACCO SCREEN RCVD TLK: CPT | Performed by: INTERNAL MEDICINE

## 2019-08-15 PROCEDURE — 4040F PNEUMOC VAC/ADMIN/RCVD: CPT | Performed by: INTERNAL MEDICINE

## 2019-08-15 PROCEDURE — 99205 OFFICE O/P NEW HI 60 MIN: CPT | Performed by: PSYCHIATRY & NEUROLOGY

## 2019-08-15 PROCEDURE — G8427 DOCREV CUR MEDS BY ELIG CLIN: HCPCS | Performed by: INTERNAL MEDICINE

## 2019-08-15 PROCEDURE — 4004F PT TOBACCO SCREEN RCVD TLK: CPT | Performed by: PSYCHIATRY & NEUROLOGY

## 2019-08-15 PROCEDURE — 1123F ACP DISCUSS/DSCN MKR DOCD: CPT | Performed by: PSYCHIATRY & NEUROLOGY

## 2019-08-15 PROCEDURE — 1123F ACP DISCUSS/DSCN MKR DOCD: CPT | Performed by: INTERNAL MEDICINE

## 2019-08-15 PROCEDURE — 3017F COLORECTAL CA SCREEN DOC REV: CPT | Performed by: INTERNAL MEDICINE

## 2019-08-15 RX ORDER — LOSARTAN POTASSIUM 25 MG/1
25 TABLET ORAL DAILY
Qty: 90 TABLET | Refills: 1 | Status: SHIPPED | OUTPATIENT
Start: 2019-08-15 | End: 2020-02-24

## 2019-08-15 RX ORDER — BLOOD PRESSURE TEST KIT
KIT MISCELLANEOUS
Qty: 1 KIT | Refills: 0 | Status: SHIPPED | OUTPATIENT
Start: 2019-08-15

## 2019-08-15 NOTE — PROGRESS NOTES
500 MG tablet Take 500 mg by mouth every 6 hours as needed for Pain      Phenylephrine-DM-GG (TUSSIN CF COUGH & COLD PO) Take by mouth as needed       No current facility-administered medications for this visit.         No Known Allergies    Past Medical History:   Diagnosis Date    Cataracts, bilateral     Diabetes ([facility]     Diabetes mellitus type 2 in nonobese (HCC)     High cholesterol     Myeloproliferative disease ([facility]     Pneumonia     Shingles     Viral meningitis 1991 [facility]       Past Surgical History:   Procedure Laterality Date    CATARACT REMOVAL Bilateral        Family History   Problem Relation Age of Onset    Alzheimer's Disease Mother      ROS   Constitutional:  Negative for fatigue, loss of appetite and unexpected weight change   HEENT            : Negative for neck stiffness and pain, no congestion or sinus pressure   Eyes                : No visual disturbance or pain   Cardiovascular: No chest pain or palpitations or leg swelling   Respiratory      : Negative for cough, shortness of breath or wheezing   Gastrointestinal: Negative for abdominal pain, constipation or diarrhea and bloating No nausea or vomiting   Genitourinary:     No urgency or frequency, no burning or hematuria   Musculoskeletal: No arthralgias, back pain or myalgias   Skin                  : Negative for rash or erythema   Neurological    : Negative for dizziness, weakness, tremors ,light headedness or syncope   Psychiatric       : Negative for dysphoric mood, sleep disturbances, nervous or anxious, or decreased concentration   All other review of systems was negative    Objective  Physical Examination:    Nursing note reviewed    BP (!) 158/60   Pulse 83   Temp 98.3 °F (36.8 °C)   Ht 5' 7.01\" (1.702 m)   Wt 131 lb 12.8 oz (59.8 kg)   SpO2 93%   BMI 20.64 kg/m²   BP Readings from Last 3 Encounters:   08/15/19 (!) 158/60   08/15/19 (!) 175/82   07/08/19 (!) 100/54         Constitutional:  Maksim Mcnally is

## 2019-08-15 NOTE — PROGRESS NOTES
intact, soft palate rises on phonation, sternocleidomastoid and trapezius intact. Tongue midline, no fasciculations. Motor: no tremors, rigidity on left arm. RUE: delta 5/5, biceps 5/5, triceps 5/5,  5/5  LUE: delta 5/5, biceps 5/5, triceps 5/5,  5/5  RLE: hf 4+/5, ke 5/5, df 5/5, pf 5/5  LLE: hf 4+/5, ke 5/5, df 5/5, pf 5/5  Reflexes: 2+ throughout, symmetric, babinski not present. Coordination: FNF no dysmetria, heel to shin okay, NASIM okay, negative Rhomberg. Gait: slow cautious gait, when turning, small steps and slow, able to do Tandem. Sensory: decreased to pp in both legs below knees, no extinction. ASSESSMENT/PLAN:   // Memory changes  - MMSE 28/30  - today SBP very high, pt does not check BP at home, risk for stroke  - MRI brain w/o  - history of heavy ETOH use  - need optimal BG control, A1c 6.3 on 7/12/2019; V12: 704; TSH WNL  - safety prcaution    // Hypertension  - -179 today  - follow with PCP     // Episode of confusion   - unclear etiology  - EEG routine    // Length dependent polyneuropathy  - likely due to history of ETOH use      >50% of 60 minute face to face time spent counseling patient. Multiple issues discussed, all questions answered. RTC after above tests done      Thank you for referring Mr. Francisco Arambula to us, shall you have any questions, please do not hesitate to let me know. Thank you.     Sincerely,    Danielle Rosales MD, MS

## 2019-08-15 NOTE — PATIENT INSTRUCTIONS
1. MRI brain w/o  2. EEG   3. Follow with primary doctor for blood pressure control  4.  Fall precaution    Return after tests done    Judith Hope MD, MS

## 2019-08-22 DIAGNOSIS — D47.1 MPN (MYELOPROLIFERATIVE NEOPLASM) (HCC): Primary | ICD-10-CM

## 2019-08-27 ENCOUNTER — HOSPITAL ENCOUNTER (OUTPATIENT)
Dept: NEUROLOGY | Age: 69
Discharge: HOME | End: 2019-08-27
Payer: MEDICARE

## 2019-08-27 ENCOUNTER — HOSPITAL ENCOUNTER (OUTPATIENT)
Dept: MRI IMAGING | Age: 69
Discharge: HOME | End: 2019-08-29
Payer: MEDICARE

## 2019-08-27 DIAGNOSIS — R41.0 EPISODE OF CONFUSION: ICD-10-CM

## 2019-08-27 DIAGNOSIS — R41.3 MEMORY LOSS: ICD-10-CM

## 2019-08-27 PROCEDURE — 95816 EEG AWAKE AND DROWSY: CPT

## 2019-08-27 PROCEDURE — 70551 MRI BRAIN STEM W/O DYE: CPT

## 2019-08-27 PROCEDURE — 95816 EEG AWAKE AND DROWSY: CPT | Performed by: PSYCHIATRY & NEUROLOGY

## 2019-08-28 ENCOUNTER — TELEPHONE (OUTPATIENT)
Dept: NEUROLOGY | Age: 69
End: 2019-08-28

## 2019-08-29 ENCOUNTER — HOSPITAL ENCOUNTER (OUTPATIENT)
Age: 69
Discharge: HOME | End: 2019-08-29
Payer: MEDICARE

## 2019-08-29 ENCOUNTER — OFFICE VISIT (OUTPATIENT)
Dept: ONCOLOGY | Age: 69
End: 2019-08-29
Payer: MEDICARE

## 2019-08-29 ENCOUNTER — TELEPHONE (OUTPATIENT)
Dept: ONCOLOGY | Age: 69
End: 2019-08-29

## 2019-08-29 VITALS
BODY MASS INDEX: 20.65 KG/M2 | SYSTOLIC BLOOD PRESSURE: 136 MMHG | HEART RATE: 96 BPM | WEIGHT: 131.9 LBS | DIASTOLIC BLOOD PRESSURE: 77 MMHG | TEMPERATURE: 97.6 F | RESPIRATION RATE: 20 BRPM

## 2019-08-29 DIAGNOSIS — D47.1 MPN (MYELOPROLIFERATIVE NEOPLASM) (HCC): ICD-10-CM

## 2019-08-29 LAB
ABSOLUTE EOS #: 0.4 K/UL (ref 0–0.4)
ABSOLUTE IMMATURE GRANULOCYTE: ABNORMAL K/UL (ref 0–0.3)
ABSOLUTE LYMPH #: 3.3 K/UL (ref 1–4.8)
ABSOLUTE MONO #: 1.1 K/UL (ref 0.1–1.2)
BASOPHILS # BLD: 1 % (ref 0–2)
BASOPHILS ABSOLUTE: 0.1 K/UL (ref 0–0.2)
DIFFERENTIAL TYPE: ABNORMAL
EOSINOPHILS RELATIVE PERCENT: 3 % (ref 1–4)
HCT VFR BLD CALC: 37.6 % (ref 41–53)
HEMOGLOBIN: 12.8 G/DL (ref 13.5–17.5)
IMMATURE GRANULOCYTES: ABNORMAL %
LYMPHOCYTES # BLD: 22 % (ref 24–44)
MCH RBC QN AUTO: 31.8 PG (ref 26–34)
MCHC RBC AUTO-ENTMCNC: 34 G/DL (ref 31–37)
MCV RBC AUTO: 93.7 FL (ref 80–100)
MONOCYTES # BLD: 7 % (ref 2–11)
NRBC AUTOMATED: ABNORMAL PER 100 WBC
PDW BLD-RTO: 13.3 % (ref 12.5–15.4)
PLATELET # BLD: 559 K/UL (ref 140–450)
PLATELET ESTIMATE: ABNORMAL
PMV BLD AUTO: 7.9 FL (ref 6–12)
RBC # BLD: 4.01 M/UL (ref 4.5–5.9)
RBC # BLD: ABNORMAL 10*6/UL
SEG NEUTROPHILS: 67 % (ref 36–66)
SEGMENTED NEUTROPHILS ABSOLUTE COUNT: 9.8 K/UL (ref 1.8–7.7)
WBC # BLD: 14.7 K/UL (ref 3.5–11)
WBC # BLD: ABNORMAL 10*3/UL

## 2019-08-29 PROCEDURE — 4004F PT TOBACCO SCREEN RCVD TLK: CPT | Performed by: INTERNAL MEDICINE

## 2019-08-29 PROCEDURE — 3017F COLORECTAL CA SCREEN DOC REV: CPT | Performed by: INTERNAL MEDICINE

## 2019-08-29 PROCEDURE — 85025 COMPLETE CBC W/AUTO DIFF WBC: CPT

## 2019-08-29 PROCEDURE — G8427 DOCREV CUR MEDS BY ELIG CLIN: HCPCS | Performed by: INTERNAL MEDICINE

## 2019-08-29 PROCEDURE — G8420 CALC BMI NORM PARAMETERS: HCPCS | Performed by: INTERNAL MEDICINE

## 2019-08-29 PROCEDURE — 99214 OFFICE O/P EST MOD 30 MIN: CPT | Performed by: INTERNAL MEDICINE

## 2019-08-29 PROCEDURE — 1123F ACP DISCUSS/DSCN MKR DOCD: CPT | Performed by: INTERNAL MEDICINE

## 2019-08-29 PROCEDURE — 4040F PNEUMOC VAC/ADMIN/RCVD: CPT | Performed by: INTERNAL MEDICINE

## 2019-08-29 PROCEDURE — 99211 OFF/OP EST MAY X REQ PHY/QHP: CPT | Performed by: INTERNAL MEDICINE

## 2019-08-29 PROCEDURE — 36415 COLL VENOUS BLD VENIPUNCTURE: CPT

## 2019-08-29 NOTE — PROGRESS NOTES
PROGRESS NOTE    PCP: Luis Felipe Canas MD  Referring Provider: No ref. provider found    IMPRESSION:   1.  Elevated white count with predominant neutrophils - Normal Jak2 and BCR/ABL   2.  Mild anemia  3.  Elevated platelet count  4.  All the above strongly suggest a myeloproliferative neoplasm  5.  Hard of hearing  6.  Prior episodes of viral meningitis cervical spine pain shingles pneumonia  7.  Diabetes mellitus  8.  Active smoker         INTERVAL HISTORY:   Since last visit patient's wife states he dizziness, memory issues, just seen neurologist this week, they are concern about possible dementia, wife states he does not have bleeding or bruising. PLAN:   1. Discussed labs with patent and wife, and discussed WBC and plts are slowly increasing, can consider bone marrow biopsy/asp, however, having an extensive work up for dementia, and possible parkinsons, and want to refrain from bone marrow at this time. 2. JAK2 and BCR/ABL were normal in the past  3.   RTC in 3 months with CBC, based on this will determine need for bone marrow biopsy.        ROS:  General: negative for fatigue, fever or night sweats  ENT: negative for headaches, hearing change, oral lesions or visual changes  Hematological and Lymphatic: negative for bleeding problems, blood clots, bruising, fatigue, night sweats or weight loss  Respiratory: negative for cough, shortness of breath or tachypnea  Cardiovascular: negative for chest pain, dyspnea on exertion, edema or paroxysmal nocturnal dyspnea  Gastrointestinal: negative for abdominal pain, appetite loss, change in bowel habits, constipation, diarrhea, melena or nausea/vomiting  Genito-Urinary: negative for dysuria, hematuria or incontinence  Musculoskeletal: negative for gait disturbance, joint pain, joint swelling or muscle pain  Neurological: positive for confusion, dizziness,  Negative headaches, seizures or tremors  Dermatological: negative for pruritus or rash      PHYSICAL EXAM:
CATARACT REMOVAL Bilateral        CURRENT MEDICATIONS:   Current Outpatient Medications   Medication Sig Dispense Refill    losartan (COZAAR) 25 MG tablet Take 1 tablet by mouth daily 90 tablet 1    Blood Pressure KIT Use as directed 1 kit 0    metFORMIN (GLUCOPHAGE) 500 MG tablet TAKE ONE TAB 2 TIMES A DAY 60 tablet 5    atorvastatin (LIPITOR) 20 MG tablet TAKE 1 TABLET BY MOUTH DAILY TAKE IN THE EVENING 30 tablet 5    omeprazole (PRILOSEC) 40 MG delayed release capsule TAKE 1 CAPSULE BY MOUTH EVERY DAY 30 capsule 5    vitamin B-12 (CYANOCOBALAMIN) 500 MCG tablet Take 1 tablet by mouth daily (Patient taking differently: Take 500 mcg by mouth daily ) 30 tablet 5    permethrin (ELIMITE) 5 % cream Apply topically       PROCTOZONE-HC 2.5 % rectal cream   5    desonide (DESOWEN) 0.05 % cream Apply topically 2 times daily. (Patient taking differently: Apply topically 2 times daily. ) 1 Tube 0    Phenylephrine-DM-GG (TUSSIN CF COUGH & COLD PO) Take by mouth as needed      DiphenhydrAMINE HCl (ALLERGY MED PO) Take 1 tablet by mouth daily       acetaminophen (TYLENOL) 500 MG tablet Take 500 mg by mouth every 6 hours as needed for Pain       No current facility-administered medications for this visit. SUBJECTIVE:  Ar Richard is a very pleasant 77 y. o. male who has an elevated white count and elevated platelet count.   Patient denies any symptoms.  No change in weight no fevers no chills no night sweats.  Active and doing normal activities. [facility] is not aware of having abnormalities in his blood counts in the past.     Reviewing the electronic health record shows that his white count is mildly elevated since 2015 and platelet count has been mildly elevated in that same time frame.     He has been relatively healthy denies any serious medical issues he is diabetic and he is an active smoker. [facility] is not interested in quitting smoking. Elif White is retired from [employer] after retired from [employer] he

## 2019-09-27 ENCOUNTER — HOSPITAL ENCOUNTER (OUTPATIENT)
Dept: PHYSICAL THERAPY | Facility: CLINIC | Age: 69
Setting detail: THERAPIES SERIES
Discharge: HOME | End: 2019-09-27
Payer: MEDICARE

## 2019-09-27 PROCEDURE — 97161 PT EVAL LOW COMPLEX 20 MIN: CPT

## 2019-09-27 PROCEDURE — 97110 THERAPEUTIC EXERCISES: CPT

## 2019-09-27 NOTE — CONSULTS
[] [facility] [address]  [address]  [phone]  F: [phone] [x] [address]  [facility] [address]   [address]  P: [phone]  F: [phone] [] [place]  [address] [facility]  P: [phone]  F: [phone] [] [address]  [facility]   [address]   [place]: [phone]  F: [phone]      Physical Therapy Neurological Evaluation    Date:  2019  Patient: Caro Boykin  : 1950  MRN: [id number]  Physician: Chen Mae MD    Insurance: Medicare,  secondary-PT to treat only  Medical Diagnosis:   I10 (ICD-10-CM) - Essential hypertension   R41.3 (ICD-10-CM) - Memory problem   R26.2 (ICD-10-CM) - Ambulatory dysfunction   R29.6 (ICD-10-CM) - Falls frequently   M25.552 (ICD-10-CM) - Left hip pain   Rehab Codes: M25.552, R29.6, R26.81, M62.81,R29.6, Z91.81  Onset date: 2019  Next 's appt.: 19    Subjective:   CC: difficulty walking and repeated falls     HPI: Pt and pt's significant other report onset of increased balance deficits beginning ~ 9 months ago; increase in falls beginning in 2019. Per pt and SO report, pt falls ~1 time per week. SO expresses concern with pt falling and \"breaking a hip\" as patient lacks safety awareness. Per SO, patient will attempt to sit without looking behind him and fall. Pt with increasing memory loss as well. Pt with history of left hip bursitis beginning approximately 2 years ago; had PT with good relief of symptoms. Pt notes that current left hip pain feels the same as before. Left hip pain is not increased with any certain positioning besides left side lying at night time. Has relief of Left hip pain with biofreeze. Pt is getting a lift chair next week.  Pt ambulates into clinic with single point cane, notes -standing static [x] [] [] [] []   -dynamic [] [x] [] [] []   Ambulation [] [] [] [] []   Distance/Device: cane    Analysis:       Special Testing: [x] Alford Test:- indicates high fall risk   [x] Other: TU seconds  5x STS: 28 seconds       Comments LEFI: /, 67.5% impairment     Assessment: Pt is a pleasant 77 y.o. Male who presents to clinic with reports of balance and gait impairments along with history of increased falls. Pt demonstrates the following deficits: rounded/slumped posture, gross bilateral lower extremity weakness, tight hip musculature, impaired ability to transfer and perform bed mobility independently, deficits in balance and gait, and TUG, 5x STS, and Alford tests indicating high risk of future falls. In addition to these physical impairments, patient also demonstrates decreased safety awareness and needs frequent cueing for safety. Pt also reported left hip pain, however it is only present when pt sleeps on L side; encouraged patient to sleep on back or R side and left hip pain/symptoms should dissipate. Pt to benefit from physical therapy services in order to improve strength, flexibility, gait mechanics, and balance impairments for improved safety during functional mobility. Problems:    [x] ? Pain: 5-7/10 L hip pain with L side lying     [x] ? flexibility: hamstrings bilaterally tight     [x] ? Strength:gross BLE weakness    [x] ? Function: history of multiple falls, fall risk indicated with special testing, reports 67.5% impairment on LEFI  [x] ? Balance- deficits in balance during transitional movements and turning   [x] Postural Deviations  [x] Gait Deviations  [x] Other: sleep disturbances d/t left hip pain       STG: (to be met in 6 treatments)  1. ? Pain: pt to report 0/10 L hip pain at night time (pt to sleep on R side) for improved sleep patterns  2. ? Function:  a.  Pt to demonstrate improved safety awareness as indicated by looking behind him prior to attempting to sit Therapy   [id number] [x] Cold/hotpack    [] Massage   [id number]      [] Lumbar/Cervical Traction  [id number]     []  Medication allergies reviewed for use of    Dexamethasone Sodium Phosphate 4mg/ml     with iontophoresis treatments. Pt is not allergic. Frequency: 2 x/weeks for 12 visits    Todays Treatment:  Modalities:   Precautions: gait belt donned for balance/gait activities, Trevin required for bed mobility and CGA-Trevin for transfers   Exercises:   Exercise Reps/ Time Weight/ Level Comments   SEATED   Issued seated HEP that patient can safety perform himself. Pt to only complete STS when SO present. marches 20x     LAQ 20x     Heel raises 20x     Toe raises 20x     Hip add squeeze 20x5\"     Hip abd 20x A Add band next         Sit to stands 2x5   Needs supervision/CGA for safety    Other: Encouraged SO to continue to cue patient either verbally or visually with a sign to reach for chair and look behind him for sitting    Specific Instructions for next treatment: balance and gait activities (tandem most challenging, also add NBOS with head turns and external perturbations, ambulation through narrow spaces/cones and over objects, lateral and retro walking), gross LE strengthening (STS, 3-way hip, etc.), hamstring stretching, practicing how to get up from ground in case of a fall.  ISSUE FALL PREVENTION     Evaluation Complexity:  History (Personal factors, comorbidities) [] 0 [] 1-2 [x] 3+   Exam (limitations, restrictions) [] 1-2 [] 3 [x] 4+   Clinical presentation (progression) [x] Stable [] Evolving  [] Unstable   Decision Making [x] Low [] Moderate [] High    [x] Low Complexity [] Moderate Complexity [] High Complexity       Treatment Charges: Mins Units   [x] Evaluation       [x]  Low       []  Moderate       []  High 50 1   []  Modalities     [x]  Ther Exercise 10 1   []  Manual Therapy     []  Ther Activities     []  Aquatics     []  Vasocompression     []  Other     Estimated cost of care to date (9/27): $104.90    TOTAL TREATMENT TIME: 60 minutes     Time in: 10:30 am      Time out: 11:35 am    Electronically signed by: Demarco Fishre PT        Physician Signature:________________________________Date:__________________  By signing above or cosigning this note, I have reviewed this plan of care and certify a need for medically necessary rehabilitation services.      *PLEASE SIGN ABOVE AND FAX BACK ALL PAGES*

## 2019-09-27 NOTE — FLOWSHEET NOTE
Caleb Fall Risk Assessment    Patient Name:  Greyson Waters  : 1950        Risk Factor Scale  Score   History of Falls [x] Yes  [] No 25  0    Secondary Diagnosis [] Yes  [x] No 15  0    Ambulatory Aid [] Furniture  [x] Crutches/cane/walker  [] None/bedrest/wheelchair/nurse 30  15  0    IV/Heparin Lock [] Yes  [x] No 20  0    Gait/Transferring [x] Impaired  [] Weak  [] Normal/bedrest/immobile 20  10  0    Mental Status [x] Forgets limitations  [] Oriented to own ability 15  0       Total: 75     Based on the Assessment score: check the appropriate box.     []  No intervention needed   Low =   Score of 0-24    []  Use standard prevention interventions Moderate =  Score of 24-44   [] Give patient handout and discuss fall prevention strategies   [] Establish goal of education for patient/family RE: fall prevention strategies    [x]  Use high risk prevention interventions High = Score of 45 and higher   [x] Give patient handout and discuss fall prevention strategies   [x] Establish goal of education for patient/family Re: fall prevention strategies   [x] Discuss lifeline / other resources    Electronically signed by:   Filomena Mchugh PT  Date: 2019

## 2019-10-01 ENCOUNTER — APPOINTMENT (OUTPATIENT)
Dept: PHYSICAL THERAPY | Facility: CLINIC | Age: 69
End: 2019-10-01
Payer: MEDICARE

## 2019-10-01 ENCOUNTER — OFFICE VISIT (OUTPATIENT)
Dept: NEUROLOGY | Age: 69
End: 2019-10-01
Payer: MEDICARE

## 2019-10-01 VITALS
BODY MASS INDEX: 20.81 KG/M2 | HEART RATE: 95 BPM | WEIGHT: 132.6 LBS | DIASTOLIC BLOOD PRESSURE: 74 MMHG | SYSTOLIC BLOOD PRESSURE: 139 MMHG | HEIGHT: 67 IN

## 2019-10-01 DIAGNOSIS — R41.3 MEMORY CHANGE: Primary | ICD-10-CM

## 2019-10-01 DIAGNOSIS — G62.9 LENGTH-DEPENDENT PERIPHERAL NEUROPATHY: ICD-10-CM

## 2019-10-01 DIAGNOSIS — R41.0 EPISODE OF CONFUSION: ICD-10-CM

## 2019-10-01 DIAGNOSIS — I10 HYPERTENSION, UNSPECIFIED TYPE: ICD-10-CM

## 2019-10-01 PROCEDURE — 1123F ACP DISCUSS/DSCN MKR DOCD: CPT | Performed by: PSYCHIATRY & NEUROLOGY

## 2019-10-01 PROCEDURE — G8420 CALC BMI NORM PARAMETERS: HCPCS | Performed by: PSYCHIATRY & NEUROLOGY

## 2019-10-01 PROCEDURE — G8484 FLU IMMUNIZE NO ADMIN: HCPCS | Performed by: PSYCHIATRY & NEUROLOGY

## 2019-10-01 PROCEDURE — 4004F PT TOBACCO SCREEN RCVD TLK: CPT | Performed by: PSYCHIATRY & NEUROLOGY

## 2019-10-01 PROCEDURE — 4040F PNEUMOC VAC/ADMIN/RCVD: CPT | Performed by: PSYCHIATRY & NEUROLOGY

## 2019-10-01 PROCEDURE — 99214 OFFICE O/P EST MOD 30 MIN: CPT | Performed by: PSYCHIATRY & NEUROLOGY

## 2019-10-01 PROCEDURE — G8427 DOCREV CUR MEDS BY ELIG CLIN: HCPCS | Performed by: PSYCHIATRY & NEUROLOGY

## 2019-10-01 PROCEDURE — 3017F COLORECTAL CA SCREEN DOC REV: CPT | Performed by: PSYCHIATRY & NEUROLOGY

## 2019-10-07 ENCOUNTER — HOSPITAL ENCOUNTER (OUTPATIENT)
Dept: PHYSICAL THERAPY | Facility: CLINIC | Age: 69
Setting detail: THERAPIES SERIES
Discharge: HOME | End: 2019-10-07
Payer: MEDICARE

## 2019-10-07 PROCEDURE — 97112 NEUROMUSCULAR REEDUCATION: CPT

## 2019-10-07 PROCEDURE — 97110 THERAPEUTIC EXERCISES: CPT

## 2019-10-07 PROCEDURE — 97530 THERAPEUTIC ACTIVITIES: CPT

## 2019-10-07 RX ORDER — OMEPRAZOLE 40 MG/1
CAPSULE, DELAYED RELEASE ORAL
Qty: 30 CAPSULE | Refills: 5 | Status: SHIPPED | OUTPATIENT
Start: 2019-10-07 | End: 2020-10-13 | Stop reason: SDUPTHER

## 2019-10-08 ENCOUNTER — APPOINTMENT (OUTPATIENT)
Dept: PHYSICAL THERAPY | Facility: CLINIC | Age: 69
End: 2019-10-08
Payer: MEDICARE

## 2019-10-10 ENCOUNTER — HOSPITAL ENCOUNTER (OUTPATIENT)
Dept: PHYSICAL THERAPY | Facility: CLINIC | Age: 69
Setting detail: THERAPIES SERIES
Discharge: HOME | End: 2019-10-10
Payer: MEDICARE

## 2019-10-10 PROCEDURE — 97530 THERAPEUTIC ACTIVITIES: CPT

## 2019-10-10 PROCEDURE — 97110 THERAPEUTIC EXERCISES: CPT

## 2019-10-10 PROCEDURE — 97112 NEUROMUSCULAR REEDUCATION: CPT

## 2019-10-15 ENCOUNTER — HOSPITAL ENCOUNTER (OUTPATIENT)
Dept: PHYSICAL THERAPY | Facility: CLINIC | Age: 69
Setting detail: THERAPIES SERIES
Discharge: HOME | End: 2019-10-15
Payer: MEDICARE

## 2019-10-17 ENCOUNTER — APPOINTMENT (OUTPATIENT)
Dept: PHYSICAL THERAPY | Facility: CLINIC | Age: 69
End: 2019-10-17
Payer: MEDICARE

## 2019-10-17 ENCOUNTER — HOSPITAL ENCOUNTER (OUTPATIENT)
Dept: PHYSICAL THERAPY | Facility: CLINIC | Age: 69
Setting detail: THERAPIES SERIES
Discharge: HOME | End: 2019-10-17
Payer: MEDICARE

## 2019-10-17 PROCEDURE — 97112 NEUROMUSCULAR REEDUCATION: CPT

## 2019-10-17 PROCEDURE — 97110 THERAPEUTIC EXERCISES: CPT

## 2019-10-17 PROCEDURE — 97530 THERAPEUTIC ACTIVITIES: CPT

## 2019-10-22 ENCOUNTER — HOSPITAL ENCOUNTER (OUTPATIENT)
Dept: PHYSICAL THERAPY | Facility: CLINIC | Age: 69
Setting detail: THERAPIES SERIES
Discharge: HOME | End: 2019-10-22
Payer: MEDICARE

## 2019-10-22 PROCEDURE — 97530 THERAPEUTIC ACTIVITIES: CPT

## 2019-10-22 PROCEDURE — 97110 THERAPEUTIC EXERCISES: CPT

## 2019-10-22 PROCEDURE — 97112 NEUROMUSCULAR REEDUCATION: CPT

## 2019-10-24 ENCOUNTER — HOSPITAL ENCOUNTER (OUTPATIENT)
Dept: PHYSICAL THERAPY | Facility: CLINIC | Age: 69
Setting detail: THERAPIES SERIES
Discharge: HOME | End: 2019-10-24
Payer: MEDICARE

## 2019-10-24 PROCEDURE — 97530 THERAPEUTIC ACTIVITIES: CPT

## 2019-10-24 PROCEDURE — 97110 THERAPEUTIC EXERCISES: CPT

## 2019-10-24 PROCEDURE — 97112 NEUROMUSCULAR REEDUCATION: CPT

## 2019-10-29 ENCOUNTER — HOSPITAL ENCOUNTER (OUTPATIENT)
Dept: PHYSICAL THERAPY | Facility: CLINIC | Age: 69
Setting detail: THERAPIES SERIES
Discharge: HOME | End: 2019-10-29
Payer: MEDICARE

## 2019-10-31 ENCOUNTER — HOSPITAL ENCOUNTER (OUTPATIENT)
Dept: PHYSICAL THERAPY | Facility: CLINIC | Age: 69
Setting detail: THERAPIES SERIES
Discharge: HOME | End: 2019-10-31
Payer: MEDICARE

## 2019-11-04 RX ORDER — ATORVASTATIN CALCIUM 20 MG/1
TABLET, FILM COATED ORAL
Qty: 30 TABLET | Refills: 5 | Status: SHIPPED | OUTPATIENT
Start: 2019-11-04 | End: 2020-10-13 | Stop reason: SDUPTHER

## 2019-11-05 ENCOUNTER — HOSPITAL ENCOUNTER (OUTPATIENT)
Dept: PHYSICAL THERAPY | Facility: CLINIC | Age: 69
Setting detail: THERAPIES SERIES
Discharge: HOME | End: 2019-11-05
Payer: MEDICARE

## 2019-11-05 PROCEDURE — 97112 NEUROMUSCULAR REEDUCATION: CPT

## 2019-11-05 PROCEDURE — 97530 THERAPEUTIC ACTIVITIES: CPT

## 2019-11-05 PROCEDURE — 97110 THERAPEUTIC EXERCISES: CPT

## 2019-11-07 ENCOUNTER — APPOINTMENT (OUTPATIENT)
Dept: PHYSICAL THERAPY | Facility: CLINIC | Age: 69
End: 2019-11-07
Payer: MEDICARE

## 2019-12-05 ENCOUNTER — HOSPITAL ENCOUNTER (OUTPATIENT)
Age: 69
Discharge: HOME | End: 2019-12-05
Payer: MEDICARE

## 2019-12-05 ENCOUNTER — OFFICE VISIT (OUTPATIENT)
Dept: ONCOLOGY | Age: 69
End: 2019-12-05
Payer: MEDICARE

## 2019-12-05 ENCOUNTER — TELEPHONE (OUTPATIENT)
Dept: ONCOLOGY | Age: 69
End: 2019-12-05

## 2019-12-05 VITALS
HEART RATE: 101 BPM | SYSTOLIC BLOOD PRESSURE: 117 MMHG | TEMPERATURE: 98.9 F | BODY MASS INDEX: 21.18 KG/M2 | DIASTOLIC BLOOD PRESSURE: 75 MMHG | WEIGHT: 135.2 LBS

## 2019-12-05 DIAGNOSIS — D47.1 MPN (MYELOPROLIFERATIVE NEOPLASM) (HCC): ICD-10-CM

## 2019-12-05 LAB
ABSOLUTE EOS #: 0.69 K/UL (ref 0–0.4)
ABSOLUTE IMMATURE GRANULOCYTE: ABNORMAL K/UL (ref 0–0.3)
ABSOLUTE LYMPH #: 3.27 K/UL (ref 1–4.8)
ABSOLUTE MONO #: 1.55 K/UL (ref 0.1–1.2)
BASOPHILS # BLD: 1 % (ref 0–2)
BASOPHILS ABSOLUTE: 0.17 K/UL (ref 0–0.2)
DIFFERENTIAL TYPE: ABNORMAL
EOSINOPHILS RELATIVE PERCENT: 4 % (ref 1–4)
HCT VFR BLD CALC: 37.6 % (ref 41–53)
HEMOGLOBIN: 12.7 G/DL (ref 13.5–17.5)
IMMATURE GRANULOCYTES: ABNORMAL %
LYMPHOCYTES # BLD: 19 % (ref 24–44)
MCH RBC QN AUTO: 30.8 PG (ref 26–34)
MCHC RBC AUTO-ENTMCNC: 33.7 G/DL (ref 31–37)
MCV RBC AUTO: 91.4 FL (ref 80–100)
MONOCYTES # BLD: 9 % (ref 2–11)
MORPHOLOGY: NORMAL
NRBC AUTOMATED: ABNORMAL PER 100 WBC
PDW BLD-RTO: 13.7 % (ref 12.5–15.4)
PLATELET # BLD: 674 K/UL (ref 140–450)
PLATELET ESTIMATE: ABNORMAL
PMV BLD AUTO: 7.5 FL (ref 6–12)
RBC # BLD: 4.11 M/UL (ref 4.5–5.9)
RBC # BLD: ABNORMAL 10*6/UL
SEG NEUTROPHILS: 67 % (ref 36–66)
SEGMENTED NEUTROPHILS ABSOLUTE COUNT: 11.52 K/UL (ref 1.8–7.7)
WBC # BLD: 17.2 K/UL (ref 3.5–11)
WBC # BLD: ABNORMAL 10*3/UL

## 2019-12-05 PROCEDURE — 1123F ACP DISCUSS/DSCN MKR DOCD: CPT | Performed by: INTERNAL MEDICINE

## 2019-12-05 PROCEDURE — G8484 FLU IMMUNIZE NO ADMIN: HCPCS | Performed by: INTERNAL MEDICINE

## 2019-12-05 PROCEDURE — 4004F PT TOBACCO SCREEN RCVD TLK: CPT | Performed by: INTERNAL MEDICINE

## 2019-12-05 PROCEDURE — 36415 COLL VENOUS BLD VENIPUNCTURE: CPT

## 2019-12-05 PROCEDURE — 4040F PNEUMOC VAC/ADMIN/RCVD: CPT | Performed by: INTERNAL MEDICINE

## 2019-12-05 PROCEDURE — G8420 CALC BMI NORM PARAMETERS: HCPCS | Performed by: INTERNAL MEDICINE

## 2019-12-05 PROCEDURE — 99211 OFF/OP EST MAY X REQ PHY/QHP: CPT | Performed by: INTERNAL MEDICINE

## 2019-12-05 PROCEDURE — G8427 DOCREV CUR MEDS BY ELIG CLIN: HCPCS | Performed by: INTERNAL MEDICINE

## 2019-12-05 PROCEDURE — 99214 OFFICE O/P EST MOD 30 MIN: CPT | Performed by: INTERNAL MEDICINE

## 2019-12-05 PROCEDURE — 85025 COMPLETE CBC W/AUTO DIFF WBC: CPT

## 2019-12-05 PROCEDURE — 3017F COLORECTAL CA SCREEN DOC REV: CPT | Performed by: INTERNAL MEDICINE

## 2019-12-05 RX ORDER — DOXYCYCLINE HYCLATE 50 MG/1
324 CAPSULE, GELATIN COATED ORAL
COMMUNITY

## 2020-01-07 RX ORDER — SODIUM CHLORIDE 9 MG/ML
INJECTION, SOLUTION INTRAVENOUS CONTINUOUS
Status: CANCELLED | OUTPATIENT
Start: 2020-01-07

## 2020-01-08 ENCOUNTER — HOSPITAL ENCOUNTER (OUTPATIENT)
Dept: CT IMAGING | Age: 70
Discharge: HOME | End: 2020-01-10
Payer: MEDICARE

## 2020-01-08 VITALS
OXYGEN SATURATION: 97 % | TEMPERATURE: 98.1 F | RESPIRATION RATE: 18 BRPM | DIASTOLIC BLOOD PRESSURE: 63 MMHG | SYSTOLIC BLOOD PRESSURE: 138 MMHG | HEIGHT: 67 IN | WEIGHT: 131.19 LBS | BODY MASS INDEX: 20.59 KG/M2 | HEART RATE: 100 BPM

## 2020-01-08 LAB
ABSOLUTE EOS #: 0.82 K/UL (ref 0–0.44)
ABSOLUTE IMMATURE GRANULOCYTE: 0.08 K/UL (ref 0–0.3)
ABSOLUTE LYMPH #: 2.7 K/UL (ref 1.1–3.7)
ABSOLUTE MONO #: 1.3 K/UL (ref 0.1–1.2)
ABSOLUTE RETIC #: 0.05 M/UL (ref 0.03–0.08)
BASOPHILS # BLD: 1 % (ref 0–2)
BASOPHILS ABSOLUTE: 0.11 K/UL (ref 0–0.2)
DIFFERENTIAL TYPE: ABNORMAL
EOSINOPHILS RELATIVE PERCENT: 5 % (ref 1–4)
HCT VFR BLD CALC: 35.7 % (ref 40.7–50.3)
HEMOGLOBIN: 11.5 G/DL (ref 13–17)
IMMATURE GRANULOCYTES: 1 %
IMMATURE RETIC FRACT: 10.9 % (ref 2.7–18.3)
INR BLD: 1
LYMPHOCYTES # BLD: 18 % (ref 24–43)
MCH RBC QN AUTO: 29.9 PG (ref 25.2–33.5)
MCHC RBC AUTO-ENTMCNC: 32.2 G/DL (ref 28.4–34.8)
MCV RBC AUTO: 93 FL (ref 82.6–102.9)
MONOCYTES # BLD: 8 % (ref 3–12)
NRBC AUTOMATED: 0 PER 100 WBC
PARTIAL THROMBOPLASTIN TIME: 27.1 SEC (ref 23–31)
PDW BLD-RTO: 13.4 % (ref 11.8–14.4)
PLATELET # BLD: 596 K/UL (ref 138–453)
PLATELET ESTIMATE: ABNORMAL
PMV BLD AUTO: 9.2 FL (ref 8.1–13.5)
PROTHROMBIN TIME: 10.2 SEC (ref 9.7–11.6)
RBC # BLD: 3.84 M/UL (ref 4.21–5.77)
RBC # BLD: ABNORMAL 10*6/UL
RETIC %: 1.4 % (ref 0.5–1.9)
RETIC HEMOGLOBIN: 31.9 PG (ref 28.2–35.7)
SEG NEUTROPHILS: 67 % (ref 36–65)
SEGMENTED NEUTROPHILS ABSOLUTE COUNT: 10.42 K/UL (ref 1.5–8.1)
WBC # BLD: 15.4 K/UL (ref 3.5–11.3)
WBC # BLD: ABNORMAL 10*3/UL

## 2020-01-08 PROCEDURE — 38221 DX BONE MARROW BIOPSIES: CPT

## 2020-01-08 PROCEDURE — 88264 CHROMOSOME ANALYSIS 20-25: CPT

## 2020-01-08 PROCEDURE — 88185 FLOWCYTOMETRY/TC ADD-ON: CPT

## 2020-01-08 PROCEDURE — 85610 PROTHROMBIN TIME: CPT

## 2020-01-08 PROCEDURE — 88305 TISSUE EXAM BY PATHOLOGIST: CPT

## 2020-01-08 PROCEDURE — 88313 SPECIAL STAINS GROUP 2: CPT

## 2020-01-08 PROCEDURE — 88280 CHROMOSOME KARYOTYPE STUDY: CPT

## 2020-01-08 PROCEDURE — 77012 CT SCAN FOR NEEDLE BIOPSY: CPT

## 2020-01-08 PROCEDURE — 2580000003 HC RX 258: Performed by: RADIOLOGY

## 2020-01-08 PROCEDURE — 7100000011 HC PHASE II RECOVERY - ADDTL 15 MIN

## 2020-01-08 PROCEDURE — 88184 FLOWCYTOMETRY/ TC 1 MARKER: CPT

## 2020-01-08 PROCEDURE — 85025 COMPLETE CBC W/AUTO DIFF WBC: CPT

## 2020-01-08 PROCEDURE — 7100000010 HC PHASE II RECOVERY - FIRST 15 MIN

## 2020-01-08 PROCEDURE — 85730 THROMBOPLASTIN TIME PARTIAL: CPT

## 2020-01-08 PROCEDURE — 88237 TISSUE CULTURE BONE MARROW: CPT

## 2020-01-08 PROCEDURE — 85045 AUTOMATED RETICULOCYTE COUNT: CPT

## 2020-01-08 PROCEDURE — 88311 DECALCIFY TISSUE: CPT

## 2020-01-08 RX ORDER — SODIUM CHLORIDE 9 MG/ML
INJECTION, SOLUTION INTRAVENOUS CONTINUOUS
Status: DISCONTINUED | OUTPATIENT
Start: 2020-01-08 | End: 2020-01-11 | Stop reason: HOSPADM

## 2020-01-08 RX ORDER — ACETAMINOPHEN 325 MG/1
650 TABLET ORAL EVERY 4 HOURS PRN
Status: DISCONTINUED | OUTPATIENT
Start: 2020-01-08 | End: 2020-01-11 | Stop reason: HOSPADM

## 2020-01-08 RX ADMIN — SODIUM CHLORIDE: 9 INJECTION, SOLUTION INTRAVENOUS at 09:28

## 2020-01-08 ASSESSMENT — PAIN SCALES - GENERAL
PAINLEVEL_OUTOF10: 0

## 2020-01-08 ASSESSMENT — PAIN - FUNCTIONAL ASSESSMENT: PAIN_FUNCTIONAL_ASSESSMENT: 0-10

## 2020-01-08 NOTE — H&P
History and Physical Service   [place] [address]    HISTORY AND PHYSICAL EXAMINATION            Date of Evaluation: 1/8/2020  Patient name:  Jose Roberto Mclean  MRN:   [id number]  YOB: 1950  PCP:    Destinee Baker MD    History Obtained From:     Patient, medical records    History of Present Illness: This is Jose Roberto Mclean a 68 y.o. male who presents today for a bone marrow biopsy by Dr. Kwame Fuller. He complains of night sweats. The patient has a history of abnormal blood work with an elevated white count with predominant neutrophils, mild anemia and an elevated platelet count indicating possible myeloproliferative disease. He has issues with an unstable gait and his wife states he has developed early mild dementia. Past Medical History:     Past Medical History:   Diagnosis Date    Cataracts, bilateral     Diabetes ([facility]     Diabetes mellitus type 2 in nonobese (HCC)     High cholesterol     Myeloproliferative disease ([facility]     Pneumonia     Shingles     [facility] meningitis [facility]        Past Surgical History:     Past Surgical History:   Procedure Laterality Date    CATARACT REMOVAL Bilateral         Medications Prior to Admission:     Prior to Admission medications    Medication Sig Start Date End Date Taking?  Authorizing Provider   ferrous gluconate (FERGON) 324 (38 Fe) MG tablet Take 324 mg by mouth daily (with breakfast)    Historical Provider, MD   atorvastatin (LIPITOR) 20 MG tablet TAKE 1 TABLET BY MOUTH DAILY TAKE IN THE EVENING 11/4/19   Governor Morales MD   metFORMIN (GLUCOPHAGE) 500 MG tablet TAKE ONE TAB 2 TIMES A DAY 11/4/19   Governor Morales MD   omeprazole (PRILOSEC) 40 MG delayed release capsule TAKE 1 CAPSULE BY MOUTH EVERY DAY 10/7/19   Governor Morales MD   Multiple Vitamins-Minerals (CENTRUM SILVER PO) Take by mouth daily    Historical Provider, MD   losartan (COZAAR) 25 MG tablet Take 1 tablet by mouth daily 8/15/19   Governor Morales MD Blood Pressure KIT Use as directed 8/15/19   Edyta Padilla MD   vitamin B-12 (CYANOCOBALAMIN) 500 MCG tablet Take 1 tablet by mouth daily 1/22/19 1/22/20  Edyta Padilla MD   permethrin (ELIMITE) 5 % cream Apply topically     Historical Provider, MD   PROCTOZONE-HC 2.5 % rectal cream  5/18/18   Historical Provider, MD   desonide (DESOWEN) 0.05 % cream Apply topically 2 times daily. Patient taking differently: Apply topically 2 times daily. 9/12/17   Edyta Padilla MD   Phenylephrine-DM-GG (TUSSIN CF COUGH & COLD PO) Take by mouth as needed    Historical Provider, MD   DiphenhydrAMINE HCl (ALLERGY MED PO) Take 1 tablet by mouth daily     Historical Provider, MD   acetaminophen (TYLENOL) 500 MG tablet Take 500 mg by mouth every 6 hours as needed for Pain    Historical Provider, MD        Allergies:     Patient has no known allergies. Social History:     Tobacco:    reports that he has been smoking cigarettes. He started smoking about 52 years ago. He has been smoking about 0.50 packs per day for the past 0.00 years. He has never used smokeless tobacco.  Alcohol:      reports no history of alcohol use. Drug Use:  reports no history of drug use. Family History:     Family History   Problem Relation Age of Onset    Alzheimer's Disease Mother        Review of Systems:     Positive and Negative as described in HPI. CONSTITUTIONAL:  negative for fevers, chills,  fatigue, weight loss  HEENT:  negative for vision, hearing changes, runny nose, throat pain  RESPIRATORY:  negative for shortness of breath, cough, congestion, wheezing. CARDIOVASCULAR:  negative for chest pain, palpitations.   GASTROINTESTINAL: He has hemorrhoids with occasional bleeding after a BM his wife states; negative for nausea, vomiting, diarrhea, constipation, change in bowel habits, abdominal pain   GENITOURINARY:  negative for difficulty of urination, burning with urination, frequency   INTEGUMENT:  negative for rash, skin lesions, easy bruising   HEMATOLOGIC/LYMPHATIC:  negative for swelling/edema   ALLERGIC/IMMUNOLOGIC:  negative for urticaria , itching  ENDOCRINE:  negative increase in drinking, increase in urination, hot or cold intolerance  MUSCULOSKELETAL:  negative joint pains, muscle aches, swelling of joints  NEUROLOGICAL:  negative for headaches, dizziness, lightheadedness, numbness, pain, tingling extremities  BEHAVIOR/PSYCH:  negative for depression, anxiety    Physical Exam:   BP (!) 146/69   Pulse 98   Temp 97.4 °F (36.3 °C) (Oral)   Resp 16   SpO2 97%       General Appearance:  alert, well appearing, and in no acute distress  Mental status: oriented to person, place, and time with normal affect  Head:  normocephalic, atraumatic. Eye: no icterus, redness, pupils equal and reactive, extraocular eye movements intact, conjunctiva clear  Ear: normal external ear, no discharge, hearing intact  Nose:  no drainage noted  Mouth: mucous membranes moist  Neck: supple, no carotid bruits, thyroid not palpable  Lungs: Bilateral equal air entry, clear to ausculation, no wheezing, rales or rhonchi, normal effort  Cardiovascular: normal rate, regular rhythm, no murmur, gallop, rub.   Abdomen: Soft, nontender, nondistended, normal bowel sounds, no hepatomegaly or splenomegaly  Neurologic: The patient walks with somewhat of a shuffled gait and leans forward;  normal speech; EOMI  Skin: No gross lesions, rashes, bruising or bleeding on exposed skin area  Extremities:  peripheral pulses palpable, no pedal edema or calf pain with palpation  Psych: normal affect         Diagnosis:      Abnormal lab work with possible myeloproliferative disease    Glenis Alpers, APRN - CNP  1/8/2020  8:59 AM

## 2020-01-08 NOTE — OP NOTE
Brief Postoperative Note    Yuki Joshi  YOB: 1950  [id number]    Pre-operative Diagnosis: anemia    Post-operative Diagnosis: Same    Procedure: bmb    Anesthesia: Local  Surgeons/Assistants: Bubba Cintron MD     Estimated Blood Loss: minimal    Complications: none immediate    Specimens: were obtained      Electronically signed by Bubba Cintron MD on 1/8/2020 at 11:54 AM

## 2020-01-10 LAB
BONE MARROW REPORT: NORMAL
CHROMOSOME STUDY: NORMAL

## 2020-01-12 LAB — FLOW CYTOMETRY, BM: NORMAL

## 2020-01-23 ENCOUNTER — TELEPHONE (OUTPATIENT)
Dept: ONCOLOGY | Age: 70
End: 2020-01-23

## 2020-01-23 ENCOUNTER — OFFICE VISIT (OUTPATIENT)
Dept: ONCOLOGY | Age: 70
End: 2020-01-23
Payer: MEDICARE

## 2020-01-23 VITALS
WEIGHT: 137.5 LBS | BODY MASS INDEX: 21.54 KG/M2 | DIASTOLIC BLOOD PRESSURE: 80 MMHG | SYSTOLIC BLOOD PRESSURE: 154 MMHG | TEMPERATURE: 98.7 F | HEART RATE: 98 BPM

## 2020-01-23 PROCEDURE — G8484 FLU IMMUNIZE NO ADMIN: HCPCS | Performed by: INTERNAL MEDICINE

## 2020-01-23 PROCEDURE — 1123F ACP DISCUSS/DSCN MKR DOCD: CPT | Performed by: INTERNAL MEDICINE

## 2020-01-23 PROCEDURE — 4004F PT TOBACCO SCREEN RCVD TLK: CPT | Performed by: INTERNAL MEDICINE

## 2020-01-23 PROCEDURE — G8420 CALC BMI NORM PARAMETERS: HCPCS | Performed by: INTERNAL MEDICINE

## 2020-01-23 PROCEDURE — 3017F COLORECTAL CA SCREEN DOC REV: CPT | Performed by: INTERNAL MEDICINE

## 2020-01-23 PROCEDURE — 99213 OFFICE O/P EST LOW 20 MIN: CPT | Performed by: INTERNAL MEDICINE

## 2020-01-23 PROCEDURE — G8427 DOCREV CUR MEDS BY ELIG CLIN: HCPCS | Performed by: INTERNAL MEDICINE

## 2020-01-23 PROCEDURE — 4040F PNEUMOC VAC/ADMIN/RCVD: CPT | Performed by: INTERNAL MEDICINE

## 2020-01-23 PROCEDURE — 99211 OFF/OP EST MAY X REQ PHY/QHP: CPT | Performed by: INTERNAL MEDICINE

## 2020-01-23 NOTE — PROGRESS NOTES
Immature Retic Fract 10.900 2.7 - 18.3 %    Retic Hemoglobin 31.9 28.2 - 35.7 pg   Flow cytometry leukemia/lymphoma bone marrow   Result Value Ref Range    Flow Cytometry, BM VBM20 3    Chromosome Study   Result Value Ref Range    Chromosome Study         [facility]  LABORATORIES  [facility]  CLINICAL CYTOGENETICS LABORATORY  [address] [place] [address], [address] [place], [address]   Tel [phone]  [address] [place] [address] for DNA Diagnostics    Specimen(s) Received:  Bone Marrow  Clinical Information:  Anemia, MPD    RESULTS:  Modal # of Chromosomes:          46                    No. of Cells  Counted:     20       Staining Method:               GTG                    No. of Cells  Analyzed:     20       No. and/or Type of Cultures:     24MF;48CM               Hypomodal  Cells:          2       No. of Karyograms:               5                    Hypermodal  Cells:          0         Karyotype:       46,XY[20]         Cytogenetic Diagnosis:  Normal male chromosome complement         Interpretation:  Cytogenetic examination of twenty metaphase cells revealed a normal  karyotype with no consistent numerical or structural chromosome  aberrations observed. Please note that this analysis does not elimina te the possibility of  single gene defects, chromosomal mosaicism involving abnormal cell  lines of low frequency, small structural chromosome abnormalities, or  failure to sample any malignant clone(s) that may be present. Professional component performed by Shy Moon, Ph.D., [address] #:[id number]). **Electronically Signed Out**  Shy Moon, Ph.D., .[facility]            Bone Marrow   Result Value Ref Range    Bone Marrow Report       VBM20-3  [facility]  [facility]  ANATOMIC PATHOLOGY  [address] [place] [address], [address].   [place], [address]  [phone]  Fax: [phone]    BONE MARROW REPORT     Patient Name: Ladonna Houser: [id number]  Path Number: [id number]  Collected: 1/8/2020  Received: 1/9/2020  Reported: 1/10/2020 13:02    -- Diagnosis --  PERIPHERAL BLOOD:  -MILD THROMBOCYTOSIS, NEUTROPHILIA AND NORMOCYTIC ANEMIA. BONE MARROW BIOPSY, ASPIRATE SMEAR AND CLOT SECTION:  -NORMOCELLULAR TRILINEAGE HEMATOPOIETIC MARROW WITH **IRON  DEPLETION**.    -- Diagnosis Comment --  PATIENT IS NOTED TO HAVE PERSISTENT MILD NEUTROPHILIA AND  THROMBOCYTOSIS; ALTHOUGH, THE BONE MARROW DOES NOT SHOW CONVINCING  EVIDENCE OF MYELOPROLIFERATIVE NEOPLASM. FROM THE EHR, BCR-ABL AND  JAK2 V617F MUTATION ANALYSES ARE NOTED TO BE NEGATIVE. MYELOPROLIFERATIVE NEOPLASM IS STILL POSSIBLE AND I WOULD RECOMMEND  ADDITIONAL TESTING TO INCLUDE CALR OR AND MPL MUTATIONS (50-60% OF ET  CASES ARE POSITIVE F OR JAK2 MUTATION, 30% CALR, 3% MPL, AND ABOUT 12%  ARE TRIPLE NEGATIVE). ROUTINE CYTOGENETICS IS PENDING. CLARITA Hodge  **Electronically Signed Out**         ajb/1/10/2020  Procedures/Addenda  FLOW CYTOMETRY REPORT     Date Ordered:     1/10/2020     Status:  Signed Out       Date Complete:     1/10/2020     By: Edmond Arroyo M.D. Date Reported:     1/10/2020       INTERPRETATION  FLOW CYTOMETRIC IMMUNOPHENOTYPING ANALYSIS OF BONE MARROW ASPIRATE  LYMPHOCYTE POPULATION IS NEGATIVE FOR B-CELL MONOCLONALITY AND T-CELL  ABERRANCY (REACTIVE LYMPHOCYTES). RESULTS-COMMENTS  SPECIMEN TYPE:               CYTOCENTRIFUGE DIFFERENTIAL CELL COUNT:                             Lymphocytes               12%  Bone Marrow Aspirate               Myeloid Cells               88%                   Viability:  86%                        Flow cytometric immunophenotyping analysis is performed on bone marrow  aspirate following RBC lysis procedure. These cells are labeled by  dire t, five color immunostaining procedure, and analyzed on a   flow cytometer.                         % POSITIVE                                    Target Cells                    RESULTS:               (Lymphocytes)    1. CD1a               0                      2.     CD2               83       3. CD3               76       4. CD4               49       5. CD5               77       6. CD7               81       7. CD8               27       8. CD10               1       9.     CD11c               7       10. CD16/56               22       11. CD19               7       12. CD20               7       13. CD22               9       14. CD23               5       15. CD25               1       16. CD45               100       17. CD57               17       18.                3       19. FMC7               6       20. Kappa               4       21. Lambda               3           This test was develop  and its performance characteristics determined  by [facility]. It has not  been cleared or approved by the U.S. Food and Drug Administration. The FDA does not require this test to go through premarket FDA review. This test is used for clinical purposes. It should not be regarded  as investigational or for research. This laboratory is certified  under the [address] (CLIA) as  qualified to perform high complexity clinical laboratory testing. Edmond Arroyo M.D. Clinical Information  Pre-op Diagnosis:  MYELOPROLIFERATIVE DISEASE   Operative Findings:  BONE MARROW BIOPSY     Source of Specimen  1: PERIPHERAL BLOOD  2: BONE MARROW BIOPSY (ASPIRATE, CLOT AND BIOPSY)    Gross Description  1. \"WESTLEY BARRIOS, PERIPHERAL BLOOD\" One [facility] stained  peripheral blood smear. 2. \"WESTLEY BARRIOS, ASP\" Two [facility] stained aspirate smears.   2a.   \"WESTLEY DENIS, ASP\" Red-brown clot, 2.8 x 1.0 x 0.6 neages are normal.  Megakaryocytes do not  appear increased and are morphologically within spectrum of normal.   Lymphocytes and plasma cells are normal.  Bone trabeculae are normal  iron stain demonstrates **iron depletion**.

## 2020-02-03 ENCOUNTER — CARE COORDINATION (OUTPATIENT)
Dept: CARE COORDINATION | Age: 70
End: 2020-02-03

## 2020-02-03 ENCOUNTER — OFFICE VISIT (OUTPATIENT)
Dept: INTERNAL MEDICINE CLINIC | Age: 70
End: 2020-02-03
Payer: MEDICARE

## 2020-02-03 ENCOUNTER — HOSPITAL ENCOUNTER (OUTPATIENT)
Dept: PHYSICAL THERAPY | Facility: CLINIC | Age: 70
Setting detail: THERAPIES SERIES
Discharge: HOME | End: 2020-02-03
Payer: MEDICARE

## 2020-02-03 VITALS
HEIGHT: 67 IN | SYSTOLIC BLOOD PRESSURE: 138 MMHG | OXYGEN SATURATION: 96 % | TEMPERATURE: 97.6 F | DIASTOLIC BLOOD PRESSURE: 68 MMHG | HEART RATE: 96 BPM | BODY MASS INDEX: 20.4 KG/M2 | WEIGHT: 130 LBS

## 2020-02-03 PROCEDURE — G8420 CALC BMI NORM PARAMETERS: HCPCS | Performed by: INTERNAL MEDICINE

## 2020-02-03 PROCEDURE — 3017F COLORECTAL CA SCREEN DOC REV: CPT | Performed by: INTERNAL MEDICINE

## 2020-02-03 PROCEDURE — 99214 OFFICE O/P EST MOD 30 MIN: CPT | Performed by: INTERNAL MEDICINE

## 2020-02-03 PROCEDURE — 4040F PNEUMOC VAC/ADMIN/RCVD: CPT | Performed by: INTERNAL MEDICINE

## 2020-02-03 PROCEDURE — 1123F ACP DISCUSS/DSCN MKR DOCD: CPT | Performed by: INTERNAL MEDICINE

## 2020-02-03 PROCEDURE — 4004F PT TOBACCO SCREEN RCVD TLK: CPT | Performed by: INTERNAL MEDICINE

## 2020-02-03 PROCEDURE — G8484 FLU IMMUNIZE NO ADMIN: HCPCS | Performed by: INTERNAL MEDICINE

## 2020-02-03 PROCEDURE — G8427 DOCREV CUR MEDS BY ELIG CLIN: HCPCS | Performed by: INTERNAL MEDICINE

## 2020-02-03 PROCEDURE — 3046F HEMOGLOBIN A1C LEVEL >9.0%: CPT | Performed by: INTERNAL MEDICINE

## 2020-02-03 PROCEDURE — 2022F DILAT RTA XM EVC RTNOPTHY: CPT | Performed by: INTERNAL MEDICINE

## 2020-02-03 ASSESSMENT — PATIENT HEALTH QUESTIONNAIRE - PHQ9
1. LITTLE INTEREST OR PLEASURE IN DOING THINGS: 0
SUM OF ALL RESPONSES TO PHQ9 QUESTIONS 1 & 2: 0
2. FEELING DOWN, DEPRESSED OR HOPELESS: 0
SUM OF ALL RESPONSES TO PHQ QUESTIONS 1-9: 0
SUM OF ALL RESPONSES TO PHQ QUESTIONS 1-9: 0

## 2020-02-03 NOTE — LETTER
2/4/2020    Hortensia Alberto  [phone] [id number]      Dear Hortensia Alberto,    My name is Cm Sy and I am a registered nurse who partners with Britt Angulo MD to improve patients' health. Britt Angulo MD believes you would benefit from working with me. As a member of your health care team, I would work with other providers involved in your care, offer education for your specific health conditions, and connect you with additional resources as needed. I will collaborate with Britt Angulo MD to support you in following your treatment plan. The additional support I provide is no additional cost to you. My primary focus is to help you achieve specific goals and improve your health. We are committed to walk with you on this journey and look forward to working with you. Please call me to further discuss your healthcare needs. I am available by phone or for appointments at the office. You can reach me at [phone].     In good health,     Cm Sy RN

## 2020-02-03 NOTE — CARE COORDINATION
Ambulatory Care Coordination Note  2/4/2020  CM Risk Score: 1  Charlson 10 Year Mortality Risk Score: 47%     ACC: Delmi Bueno RN    Summary Note: Met with patient and wife Warner [place]. Introduced self and reason for seeing patient, they are receptive to [facility]. Patient is frail elderly 68 yr old male, he uses a cane for assistance. He lives with wife/caregiver. Wife wants a note for POA, states he is incompetent to write checks and bank requested a Dr. Champ Guallpa. PCP suggested to f/u with neuro as last MMSE was 29/30. Patient's wife requested home health referral for PT at home since he has difficulty going to outpt. Will send referral to [facility]. CM will add to [facility] panel, send letter. Send orders to [facility]. Follow up with patient/wife next week. Ambulatory Care Coordination Assessment    Care Coordination Protocol  Program Enrollment:  Complex Care  Referral from Primary Care Provider:  No  Week 1 - Initial Assessment     Do you have all of your prescriptions and are they filled?:  Yes  Barriers to medication adherence:  None  How often do you have trouble taking your medications the way you have been told to take them?:  I always take them as prescribed. Do you have Home O2 Therapy?:  No      Ability to seek help/take action for Emergent Urgent situations i.e. fire, crime, inclement weather or health crisis.:  Needs Assistance  Ability to ambulate to restroom:  Independent  Ability handle personal hygeine needs (bathing/dressing/grooming): Independent  Ability to manage Medications:  Needs Assistance  Ability to prepare Food Preparation:  Dependent  Ability to maintain home (clean home, laundry):  Dependent  Ability to drive and/or has transportation:  Dependent  Ability to do shopping:  Dependent  Ability to manage finances:   Independent  Is patient able to live independently?:  No     Current Housing:  Private Residence        Per the [facility], did the patient have 2 or more falls or 1 fall with injury in the past year?:  Yes  How often do you think you are about to fall and you do NOT fall? For example, you grab something to stabilize yourself or hold onto a wall/furniture?:  Occasionally  Use of a Mobility Aid:  Yes  Difficulty walking/impaired gait:  Yes  Issues with feet or shoes like numbness, edema, shoes not fitting:  No  Changes in vision, poor vision or poor lighting in environment:  No     Frequent urination at night?:  No  Do you use rails/bars?:  Yes     Are you experiencing loss of meaning?:  No  Are you experiencing loss of hope and peace?:  No     Thinking about your patient's physical health needs, are there any symptoms or problems (risk indicators) you are unsure about that require further investigation?:  No identified areas of uncertainly or problems already being investigated   Are the patients physical health problems impacting on their mental well-being?:  Mild impact on mental well-being e.g. \"\"feeling fed-up\"\", \"\"reduced enjoyment\"\"   Are there any problems with your patients lifestyle behaviors (alcohol, drugs, diet, exercise) that are impacting on physical or mental well-being?:  No identified areas of concern   Do you have any other concerns about your patients mental well-being?  How would you rate their severity and impact on the patient?:  Mild problems - don't interfere with function   How would you rate their home environment in terms of safety and stability (including domestic violence, insecure housing, neighbor harassment)?:  Consistently safe, supportive, stable, no identified problems   How do daily activities impact on the patient's well-being? (include current or anticipated unemployment, work, caregiving, access to transportation or other):  No identified problems or perceived positive benefits   How would you rate their social network (family, work, friends)?:  Adequate participation with social networks   How would you rate their financial

## 2020-02-04 ENCOUNTER — CARE COORDINATION (OUTPATIENT)
Dept: CARE COORDINATION | Age: 70
End: 2020-02-04

## 2020-02-11 ENCOUNTER — CARE COORDINATION (OUTPATIENT)
Dept: CARE COORDINATION | Age: 70
End: 2020-02-11

## 2020-02-11 SDOH — ECONOMIC STABILITY: FOOD INSECURITY: WITHIN THE PAST 12 MONTHS, THE FOOD YOU BOUGHT JUST DIDN'T LAST AND YOU DIDN'T HAVE MONEY TO GET MORE.: NEVER TRUE

## 2020-02-11 SDOH — SOCIAL STABILITY: SOCIAL NETWORK: ARE YOU MARRIED, WIDOWED, DIVORCED, SEPARATED, NEVER MARRIED, OR LIVING WITH A PARTNER?: WIDOWED

## 2020-02-11 SDOH — SOCIAL STABILITY: SOCIAL NETWORK: HOW OFTEN DO YOU GET TOGETHER WITH FRIENDS OR RELATIVES?: ONCE A WEEK

## 2020-02-11 SDOH — ECONOMIC STABILITY: FOOD INSECURITY: WITHIN THE PAST 12 MONTHS, YOU WORRIED THAT YOUR FOOD WOULD RUN OUT BEFORE YOU GOT MONEY TO BUY MORE.: NEVER TRUE

## 2020-02-11 SDOH — HEALTH STABILITY: PHYSICAL HEALTH: ON AVERAGE, HOW MANY DAYS PER WEEK DO YOU ENGAGE IN MODERATE TO STRENUOUS EXERCISE (LIKE A BRISK WALK)?: 0 DAYS

## 2020-02-11 SDOH — ECONOMIC STABILITY: TRANSPORTATION INSECURITY
IN THE PAST 12 MONTHS, HAS THE LACK OF TRANSPORTATION KEPT YOU FROM MEDICAL APPOINTMENTS OR FROM GETTING MEDICATIONS?: NO

## 2020-02-11 SDOH — ECONOMIC STABILITY: TRANSPORTATION INSECURITY
IN THE PAST 12 MONTHS, HAS LACK OF TRANSPORTATION KEPT YOU FROM MEETINGS, WORK, OR FROM GETTING THINGS NEEDED FOR DAILY LIVING?: NO

## 2020-02-11 SDOH — SOCIAL STABILITY: SOCIAL NETWORK: IN A TYPICAL WEEK, HOW MANY TIMES DO YOU TALK ON THE PHONE WITH FAMILY, FRIENDS, OR NEIGHBORS?: THREE TIMES A WEEK

## 2020-02-11 SDOH — ECONOMIC STABILITY: INCOME INSECURITY: HOW HARD IS IT FOR YOU TO PAY FOR THE VERY BASICS LIKE FOOD, HOUSING, MEDICAL CARE, AND HEATING?: NOT HARD AT ALL

## 2020-02-11 SDOH — SOCIAL STABILITY: SOCIAL NETWORK: HOW OFTEN DO YOU ATTENT MEETINGS OF THE CLUB OR ORGANIZATION YOU BELONG TO?: NEVER

## 2020-02-11 SDOH — HEALTH STABILITY: MENTAL HEALTH: HOW OFTEN DO YOU HAVE A DRINK CONTAINING ALCOHOL?: NEVER

## 2020-02-11 SDOH — HEALTH STABILITY: MENTAL HEALTH
STRESS IS WHEN SOMEONE FEELS TENSE, NERVOUS, ANXIOUS, OR CAN'T SLEEP AT NIGHT BECAUSE THEIR MIND IS TROUBLED. HOW STRESSED ARE YOU?: ONLY A LITTLE

## 2020-02-11 SDOH — SOCIAL STABILITY: SOCIAL NETWORK
DO YOU BELONG TO ANY CLUBS OR ORGANIZATIONS SUCH AS CHURCH GROUPS UNIONS, FRATERNAL OR ATHLETIC GROUPS, OR SCHOOL GROUPS?: NO

## 2020-02-11 SDOH — HEALTH STABILITY: PHYSICAL HEALTH: ON AVERAGE, HOW MANY MINUTES DO YOU ENGAGE IN EXERCISE AT THIS LEVEL?: 0 MIN

## 2020-02-11 NOTE — CARE COORDINATION
Ambulatory Care Coordination Note  2/11/2020  CM Risk Score: 1  Charlson 10 Year Mortality Risk Score: 47%     ACC: Edmond Ridley, RN    Summary Note: spoke to patient's caregiver Annie Swenson who states he is doing just fine. She states that he had been falling a while back but not in past several weeks. She goes to take care of her elderly mother and it is hard for her to take him with her, so she needs someone to come and sit with him couple days during the week. She states that he is eating and drinking pretty good. States that [facility] [place] to come next week to start visits and the [place] was supposed to send someone out and no one has come yet. DM- his BS have been good. His last A1C was 6.3 in Aug. Reminded her that he has labs per Dr. Leann Darnell to get done to check his A1C and lipids. She states he just had some labs done at the [facility]. CM will follow up with patient/wife in couple weeks. General Assessment    Do you have any symptoms that are causing concern?:  No       Diabetes Assessment    Meal Planning:  None   Do you have barriers with adherence to non-pharmacologic self-management interventions? (Nutrition/Exercise/Self-Monitoring):  No   Have you ever had to go to the ED for symptoms of low blood sugar?:  No       No patient-reported symptoms            Care Coordination Interventions    Program Enrollment:  Complex Care  Referral from Primary Care Provider:  No  Suggested Interventions and [address] [place] [address] H[address]:  Completed (Comment: [facility])  Occupational Therapy:  Completed  Physical Therapy:  Completed  Zone Management Tools:  Completed (Comment: DM zone tool)         Goals Addressed                 This Visit's Progress     Conditions and Symptoms   On track     I will schedule office visits, as directed by my provider. I will keep my appointment or reschedule if I have to cancel. I will notify my provider of any barriers to my plan of care.   I will follow

## 2020-02-14 ENCOUNTER — OFFICE VISIT (OUTPATIENT)
Dept: NEUROLOGY | Age: 70
End: 2020-02-14
Payer: MEDICARE

## 2020-02-14 VITALS
HEART RATE: 93 BPM | HEIGHT: 67 IN | WEIGHT: 126 LBS | DIASTOLIC BLOOD PRESSURE: 71 MMHG | BODY MASS INDEX: 19.78 KG/M2 | SYSTOLIC BLOOD PRESSURE: 128 MMHG

## 2020-02-14 PROCEDURE — 4004F PT TOBACCO SCREEN RCVD TLK: CPT | Performed by: PSYCHIATRY & NEUROLOGY

## 2020-02-14 PROCEDURE — 4040F PNEUMOC VAC/ADMIN/RCVD: CPT | Performed by: PSYCHIATRY & NEUROLOGY

## 2020-02-14 PROCEDURE — G8484 FLU IMMUNIZE NO ADMIN: HCPCS | Performed by: PSYCHIATRY & NEUROLOGY

## 2020-02-14 PROCEDURE — G8420 CALC BMI NORM PARAMETERS: HCPCS | Performed by: PSYCHIATRY & NEUROLOGY

## 2020-02-14 PROCEDURE — 3017F COLORECTAL CA SCREEN DOC REV: CPT | Performed by: PSYCHIATRY & NEUROLOGY

## 2020-02-14 PROCEDURE — G8427 DOCREV CUR MEDS BY ELIG CLIN: HCPCS | Performed by: PSYCHIATRY & NEUROLOGY

## 2020-02-14 PROCEDURE — 99215 OFFICE O/P EST HI 40 MIN: CPT | Performed by: PSYCHIATRY & NEUROLOGY

## 2020-02-14 PROCEDURE — 1123F ACP DISCUSS/DSCN MKR DOCD: CPT | Performed by: PSYCHIATRY & NEUROLOGY

## 2020-02-14 NOTE — PROGRESS NOTES
Protime 01/08/2020 10.2  9.7 - 11.6 sec Final    INR 01/08/2020 1.0   Final    Comment:       Therapeutic Range: Moderate Anticoagulant Intensity:     INR = 2.0-3.0   High Anticoagulant Intensity:     INR = 2.5-3.5        High anticoagulant intensity for patients with a mechanical prosthetic heart valve,   thrombosis and antiphospholipid syndrome, or myocardial infarction.       WBC 01/08/2020 15.4* 3.5 - 11.3 k/uL Final    RBC 01/08/2020 3.84* 4.21 - 5.77 m/uL Final    Hemoglobin 01/08/2020 11.5* 13.0 - 17.0 g/dL Final    Hematocrit 01/08/2020 35.7* 40.7 - 50.3 % Final    MCV 01/08/2020 93.0  82.6 - 102.9 fL Final    MCH 01/08/2020 29.9  25.2 - 33.5 pg Final    MCHC 01/08/2020 32.2  28.4 - 34.8 g/dL Final    RDW 01/08/2020 13.4  11.8 - 14.4 % Final    Platelets 49/91/3415 596* 138 - 453 k/uL Final    MPV 01/08/2020 9.2  8.1 - 13.5 fL Final    NRBC Automated 01/08/2020 0.0  0.0 per 100 WBC Final    Differential Type 01/08/2020 NOT REPORTED   Final    Seg Neutrophils 01/08/2020 67* 36 - 65 % Final    Lymphocytes 01/08/2020 18* 24 - 43 % Final    Monocytes 01/08/2020 8  3 - 12 % Final    Eosinophils % 01/08/2020 5* 1 - 4 % Final    Basophils 01/08/2020 1  0 - 2 % Final    Immature Granulocytes 01/08/2020 1* 0 % Final    Segs Absolute 01/08/2020 10.42* 1.50 - 8.10 k/uL Final    Absolute Lymph # 01/08/2020 2.70  1.10 - 3.70 k/uL Final    Absolute Mono # 01/08/2020 1.30* 0.10 - 1.20 k/uL Final    Absolute Eos # 01/08/2020 0.82* 0.00 - 0.44 k/uL Final    Basophils Absolute 01/08/2020 0.11  0.00 - 0.20 k/uL Final    Absolute Immature Granulocyte 01/08/2020 0.08  0.00 - 0.30 k/uL Final    WBC Morphology 01/08/2020 NOT REPORTED   Final    RBC Morphology 01/08/2020 NOT REPORTED   Final    Platelet Estimate 74/66/8117 NOT REPORTED   Final    Retic % 01/08/2020 1.4  0.5 - 1.9 % Final    Absolute Retic # 01/08/2020 0.053  0.030 - 0.080 M/uL Final    Immature Retic Fract 01/08/2020 10.900  2.7 -   flow cytometer.                        % POSITIVE                                    Target Cells                    RESULTS:               (Lymphocytes)    1. CD1a               0                      2.     CD2               83       3. CD3               76       4. CD4               49       5. CD5               77       6. CD7               81       7. CD8               27       8. CD10               1       9.     CD11c               7       10. CD16/56               22       11. CD19               7       12. CD20               7       13. CD22               9       14. CD23               5       15. CD25               1       16. CD45               100       17. CD57               17       18.                3       19. FMC7               6       20. Kappa               4       21. Lambda               3           This test was develop                           and its performance characteristics determined  by [facility] [address] [facility]. It has not  been cleared or approved by the U.S. Food and Drug Administration. The FDA does not require this test to go through premarket FDA review. This test is used for clinical purposes. It should not be regarded  as investigational or for research. This laboratory is certified  under the [address] (CLIA) as  qualified to perform high complexity clinical laboratory testing. Loren Le M.D. Clinical Information  Pre-op Diagnosis:  MYELOPROLIFERATIVE DISEASE   Operative Findings:  BONE MARROW BIOPSY     Source of Specimen  1: PERIPHERAL BLOOD  2: BONE MARROW BIOPSY (ASPIRATE, CLOT AND BIOPSY)    Gross Description  1. \"WESTLEY BARRIOS, PERIPHERAL BLOOD\" One [facility] stained  peripheral blood smear. 2.   \"WESTLEY BARRIOS, ASP\" Two [facility] stained aspirate smears. 2a.                            \"WESTLEY BARRIOS, ASP\" Red-brown clot, 2.8 x 1.0 x 0.6 cm in  aggregate. Representative sections 1cs. 2b.  \"WESTLEY BARRIOS, CORE\" Tan-white core, 1.8 cm in length x 0.3 cm  in diameter. Submitted in toto after short decalcification, 1cs. rh  tm        Microscopic Description  HEMOGRAM                              DIFFERENTIAL %     ABSOLUTE  (K/UL)    WBC (K/ul)     15.4               IG               1          0.08  RBC (K/UL)     3.84               SEGS               67          10.42  HGB (G/DL)     11.5               LYMPHS          18          2.70  HCT (%)     35.7                                     MCV (FL.)     93.0               MONOS          8          1.30  MCH (PG.)     29.9               EOS               5          0.82  MCHC (g/dL)     32.2               BASO               1          0.11  RDW (%)     13.4                                          PLT (k/ul)     596                                                                  MORPHOLOGY: Normal        BONE MARROW                           ASPIRATE SMEAR  Normal %  (0-2)                1 %     Blasts  (1-5)                2 %     Promyelo  (32-72)                63 %     Myelos/Metas/Bands/Segs  (13-37)                22 %     Erythroid precursors  (7-23)                7 %     Lymphocytes  (0-2)                2 %     Plasma cells   (1-6)                1 %     Eosinophils  (0-1)                1 %     Basophils  (0-4)                1 %     Monocytes    Myeloid/erythroid     3.1: 1  Aspirate smear quality is good. Erythroid and myeloid lineages are  normal.  Megakaryocytes are normal in number, size and have normal  appearing lobate nuclei. Lymphocytes are normal.  Plasma cells number  in the higher range of normal but are morphologically within normal.   Iron stained smear is hypocellular and negative for iron stores.     BONE MARROW BIOPSY AND ASPIRATE CLOT SECTION  Bone marrow biopsy and objects, patient repeat (3/3)  Attention/calculation: serial 7 (1/5) or back spell WORLD (2/5)  Recall: above three objects (3/3)  Language: naming (2/2); repeat (1/1); three stage commands (3/3)                    Read and obey (1/1); write sentence (1/1); copy design (0/1)  Total:25-26/30     MOCA: 23/30    CNs: PERRLA, EOMI, VF full, sensation intact, face symmetric, hearing intact, soft palate rises on phonation, sternocleidomastoid and trapezius intact. Tongue midline, no fasciculations. Motor: no abnormal movements, tone and bulk okay. RUE: delta 5/5, biceps 5/5, triceps 5/5,  5/5  LUE: delta 5/5, biceps 5/5, triceps 5/5,  5/5  RLE: hf 4+/5, ke 5/5, df 5/5, pf 5/5  LLE: hf 4+/5, ke 5/5, df 5/5, pf 5/5  Reflexes: 2+ throughout, symmetric, babinski not present. Coordination: FNF no dysmetria, heel to shin okay, NASIM okay, negative Rhomberg. Gait: cautious, slow gait, difficulty in Tandem. Sensory: stock distribution reduction below both knees, no extinction. ASSRSSMENT/PLANS:      // Mild cognitive impairment  - MMSE 25-26/30, MOCA 23/30  - MRI brain w/o 8/2019: old lacunar stroke   - EEG: normal   - pt's significant other asked to fill bank letter which she did not bring in today. She said she also has power of  document, also asked her to bring to me. I told her I will fill the form based on my today's exam and finding. Significant other voiced understanding.      // Length dependent peripheral neuropathy  - likely due to history of ETOH use, leg weakness    // Unsteadiness  - likely multifactorial including leg weakness  - completed PT last year  - continue fall precaution      >50% of 40 minute face to face time spent counseling patient, multiple issues discussed, all questions answered.      RTC in 6-8 months      Latoya Flynn MD, MS

## 2020-02-21 NOTE — TELEPHONE ENCOUNTER
Last visit: 2/3/2020  Last Med refill: 8/15/19  Does patient have enough medication for 72 hours: NA    Next Visit Date:  Future Appointments   Date Time Provider Kiara Taylor   8/5/2020  1:15 PM Costa Buchanan MD [facility] [address] Maintenance   Topic Date Due    AAA screen  1950    Hepatitis C screen  1950    Diabetic retinal exam  11/29/1958    DTaP/Tdap/Td vaccine (1 - Tdap) 11/29/1959    Hepatitis B vaccine (1 of 3 - Risk 3-dose series) 11/29/1967    Shingles Vaccine (2 of 3) 08/10/2017    Flu vaccine (1) 09/01/2019    Diabetic foot exam  09/25/2019    Diabetic microalbuminuria test  01/18/2020    Lipid screen  01/18/2020    Annual Wellness Visit (AWV)  03/07/2020    A1C test (Diabetic or Prediabetic)  07/12/2020    Potassium monitoring  07/12/2020    Creatinine monitoring  07/12/2020    Colon cancer screen colonoscopy  03/29/2022    Pneumococcal 65+ years Vaccine  Completed    Hepatitis A vaccine  Aged Out    Hib vaccine  Aged Out    Meningococcal (ACWY) vaccine  Aged Out       Hemoglobin A1C (%)   Date Value   07/12/2019 6.3 (H)   01/18/2019 6.2 (H)   09/25/2018 6.4             ( goal A1C is < 7)   Microalb/Crt.  Ratio (mcg/mg creat)   Date Value   01/18/2019 83 (H)     LDL Cholesterol (mg/dL)   Date Value   01/18/2019 105   01/10/2018 110       (goal LDL is <100)   AST (U/L)   Date Value   07/12/2019 19     ALT (U/L)   Date Value   07/12/2019 21     BUN (mg/dL)   Date Value   07/12/2019 11     BP Readings from Last 3 Encounters:   02/14/20 128/71   02/03/20 138/68   01/23/20 (!) 154/80          (goal 120/80)    All Future Testing planned in [facility]  Lab Frequency Next Occurrence   CBC Auto Differential Once 04/23/2020   Comprehensive Metabolic Panel Once 30/93/7839   Comprehensive Metabolic Panel Once 27/70/5969   Lipid Panel Once 02/14/2020   Hemoglobin A1C Once 02/03/2020   Microalbumin / Creatinine Urine Ratio Once 02/03/2020   CBC With Auto

## 2020-02-24 RX ORDER — LOSARTAN POTASSIUM 25 MG/1
25 TABLET ORAL DAILY
Qty: 90 TABLET | Refills: 1 | Status: SHIPPED | OUTPATIENT
Start: 2020-02-24 | End: 2020-10-13 | Stop reason: SDUPTHER

## 2020-02-25 ENCOUNTER — CARE COORDINATION (OUTPATIENT)
Dept: CARE COORDINATION | Age: 70
End: 2020-02-25

## 2020-02-25 NOTE — CARE COORDINATION
Ambulatory Care Coordination Note  2/25/2020  CM Risk Score: 1  Charlson 10 Year Mortality Risk Score: 47%     ACC: Janella Boeck, RN    Summary Note: received call back from patient's wife Mery Pinto who states that home care came out and told them that they can't help with an aide service. CM inquired if they have applied for Passport or waiver program and wife states that the [facility] is coming out next week to see him for getting him setup with an aide. CM will follow up with patient/wife the end of next week. Care Coordination Interventions    Program Enrollment:  Complex Care  Referral from Primary Care Provider:  No  Suggested Interventions and [address]:  Completed (Comment: [facility])  Occupational Therapy:  Completed  Physical Therapy:  Completed  Zone Management Tools:  Completed (Comment: SHER zone tool)         Goals Addressed                 This Visit's Progress     Conditions and Symptoms   On track     I will schedule office visits, as directed by my provider. I will keep my appointment or reschedule if I have to cancel. I will notify my provider of any barriers to my plan of care. I will follow my Zone Management tool to seek urgent or emergent care. I will notify my provider of any symptoms that indicate a worsening of my condition. Barriers: impairment:  physical: mobility and lack of education  Plan for overcoming my barriers: work with Care Manager and Mike Sampson  Confidence: 7/10  Anticipated Goal Completion Date: 6/1/2020              Prior to Admission medications    Medication Sig Start Date End Date Taking?  Authorizing Provider   losartan (COZAAR) 25 MG tablet TAKE 1 TABLET BY MOUTH DAILY 2/24/20   Ac Mendez MD   ferrous gluconate (FERGON) 324 (38 Fe) MG tablet Take 324 mg by mouth daily (with breakfast)    Historical Provider, MD   atorvastatin (LIPITOR) 20 MG tablet TAKE 1 TABLET BY MOUTH DAILY TAKE IN THE EVENING 11/4/19   Ac Mendez MD   metFORMIN (GLUCOPHAGE) 500 MG tablet TAKE ONE TAB 2 TIMES A DAY 11/4/19   Ac Mendez MD   omeprazole (PRILOSEC) 40 MG delayed release capsule TAKE 1 CAPSULE BY MOUTH EVERY DAY 10/7/19   Ac Mendez MD   Multiple Vitamins-Minerals (CENTRUM SILVER PO) Take by mouth daily    Historical Provider, MD   Blood Pressure KIT Use as directed 8/15/19   Ac Mendez MD   vitamin B-12 (CYANOCOBALAMIN) 500 MCG tablet Take 1 tablet by mouth daily  Patient not taking: Reported on 2/3/2020 1/22/19 1/22/20  Ac Mendez MD   PROCTOZONE-HC 2.5 % rectal cream  5/18/18   Historical Provider, MD   DiphenhydrAMINE HCl (ALLERGY MED PO) Take 1 tablet by mouth daily     Historical Provider, MD   acetaminophen (TYLENOL) 500 MG tablet Take 500 mg by mouth every 6 hours as needed for Pain    Historical Provider, MD       Future Appointments   Date Time Provider Kiara Vazquez   8/5/2020  1:15 PM Ac Mendez MD [facility]

## 2020-02-25 NOTE — CARE COORDINATION
Ambulatory Care Coordination Note  2/25/2020  CM Risk Score: 1  Charlson 10 Year Mortality Risk Score: 47%     ACC: Bal Blanco RN    Summary Note: attempted to contact patient's caregiver jake Hough to return call to this nurse at [phone]. Spoke to patient who states he is doing good. States he is eating and drinking good. Denies having any symptoms of concern. DM: he states his BS have been good and are in the 100's. Encouraged to eat small meals every 4-5 hours. Reminder to get labs done and he states he will tell Dorothy Woodson so she can take him. He states his legs get weak and he falls but denies having any injury. He uses a cane. Instructed on fall precautions. CM will follow up with patient/Adityaa in a week. General Assessment    Do you have any symptoms that are causing concern?:  No       Diabetes Assessment    Meal Planning:  None   Do you have barriers with adherence to non-pharmacologic self-management interventions? (Nutrition/Exercise/Self-Monitoring):  No   Have you ever had to go to the ED for symptoms of low blood sugar?:  No       No patient-reported symptoms          Care Coordination Interventions    Program Enrollment:  Complex Care  Referral from Primary Care Provider:  No  Suggested Interventions and [address] [place], [place] [address]:  Completed (Comment: [facility])  Occupational Therapy:  Completed  Physical Therapy:  Completed  Zone Management Tools:  Completed (Comment: DM zone tool)         Goals Addressed                 This Visit's Progress     Conditions and Symptoms   On track     I will schedule office visits, as directed by my provider. I will keep my appointment or reschedule if I have to cancel. I will notify my provider of any barriers to my plan of care. I will follow my Zone Management tool to seek urgent or emergent care. I will notify my provider of any symptoms that indicate a worsening of my condition.     Barriers: impairment: physical: mobility and lack of education  Plan for overcoming my barriers: work with Care Manager and [facility]  Confidence: 7/10  Anticipated Goal Completion Date: 6/1/2020              Prior to Admission medications    Medication Sig Start Date End Date Taking?  Authorizing Provider   losartan (COZAAR) 25 MG tablet TAKE 1 TABLET BY MOUTH DAILY 2/24/20   Melly Rai MD   ferrous gluconate (FERGON) 324 (38 Fe) MG tablet Take 324 mg by mouth daily (with breakfast)    Historical Provider, MD   atorvastatin (LIPITOR) 20 MG tablet TAKE 1 TABLET BY MOUTH DAILY TAKE IN THE EVENING 11/4/19   Melly Rai MD   metFORMIN (GLUCOPHAGE) 500 MG tablet TAKE ONE TAB 2 TIMES A DAY 11/4/19   Melly Rai MD   omeprazole (PRILOSEC) 40 MG delayed release capsule TAKE 1 CAPSULE BY MOUTH EVERY DAY 10/7/19   Melly Rai MD   Multiple Vitamins-Minerals (CENTRUM SILVER PO) Take by mouth daily    Historical Provider, MD   Blood Pressure KIT Use as directed 8/15/19   Melly Rai MD   vitamin B-12 (CYANOCOBALAMIN) 500 MCG tablet Take 1 tablet by mouth daily  Patient not taking: Reported on 2/3/2020 1/22/19 1/22/20  Melly Rai MD   PROCTOZONE-HC 2.5 % rectal cream  5/18/18   Historical Provider, MD   DiphenhydrAMINE HCl (ALLERGY MED PO) Take 1 tablet by mouth daily     Historical Provider, MD   acetaminophen (TYLENOL) 500 MG tablet Take 500 mg by mouth every 6 hours as needed for Pain    Historical Provider, MD       Future Appointments   Date Time Provider Kiara Vazquez   8/5/2020  1:15 PM Melly Rai MD [facility]

## 2020-03-09 ENCOUNTER — CARE COORDINATION (OUTPATIENT)
Dept: CARE COORDINATION | Age: 70
End: 2020-03-09

## 2020-03-16 ENCOUNTER — CARE COORDINATION (OUTPATIENT)
Dept: CARE COORDINATION | Age: 70
End: 2020-03-16

## 2020-03-16 NOTE — CARE COORDINATION
Ambulatory Care Coordination Note  3/16/2020  CM Risk Score: 1  Charlson 10 Year Mortality Risk Score: 47%     ACC: Hiren Novoa, RN    Summary Note: Patient is still at [facility]. CM will follow up with patient/SO next week.      Future Appointments   Date Time Provider Kiara Vazquez   8/5/2020  1:15 PM Guevara Rivera MD [facility]

## 2020-03-24 ENCOUNTER — CARE COORDINATION (OUTPATIENT)
Dept: CARE COORDINATION | Age: 70
End: 2020-03-24

## 2020-03-24 NOTE — CARE COORDINATION
Ambulatory Care Coordination Note  3/24/2020  CM Risk Score: 1  Charlson 10 Year Mortality Risk Score: 47%     ACC: Indira Fabian, RN    Summary Note: a.ttempted to contact patient,no answer, lvm to return call to this nurse at [phone]. If no return call will attempt to contact again next week.      Future Appointments   Date Time Provider Kiara Vazquez   8/5/2020  1:15 PM Beryle Connor, MD [facility] Minta Delay

## 2020-04-01 ENCOUNTER — CARE COORDINATION (OUTPATIENT)
Dept: CARE COORDINATION | Age: 70
End: 2020-04-01

## 2020-04-01 NOTE — PATIENT INSTRUCTIONS
www.who.int for information about the virus, including updates on the outbreak and for travel advice. Follow-up care is a key part of your treatment and safety. Be sure to make and go to all appointments, and call your doctor if you are having problems. It's also a good idea to know your test results and keep a list of the medicines you take. How can you care for yourself at home? · Stay home. Don't go to school, work, or public areas. And don't use public transportation. · Leave your home only if you need to get medical care. But call the doctor's office first so they know you're coming, and wear a face mask when you go. · Wear a face mask if you are sick and are around other people. It can help stop the spread of the virus when you cough or sneeze. · Limit contact with people in your home. If possible, stay in a separate bedroom and use a separate bathroom. · Avoid contact with pets and other animals. · Cover your mouth and nose with a tissue when you cough or sneeze. Then throw it in the trash right away. · Wash your hands often, especially after you cough or sneeze. Use soap and water, and scrub for at least 20 seconds. If soap and water aren't available, use an alcohol-based hand . · Don't share personal household items. These include bedding, towels, cups and glasses, and eating utensils. · Clean and disinfect your home every day. Use household  and disinfectant wipes or sprays. Take special care to clean things that you grab with your hands. These include doorknobs, remote controls, phones, and handles on your refrigerator and microwave. And don't forget countertops, tabletops, bathrooms, and computer keyboards. · Be safe with medicines. Take your medicines exactly as prescribed. Call your doctor if you think you are having a problem with your medicine. · Wait for your doctor or other health professional to tell you it's okay to leave isolation.  Call them when the time is up and let them know how you're feeling. When should you call for help? Call 911 anytime you think you may need emergency care. For example, call if:    · You have severe trouble breathing.     · You have severe dehydration. Symptoms of dehydration include:  ? Dry eyes and a dry mouth. ? Passing only a little urine. ? Feeling thirstier than usual.     · You are extremely confused or not thinking clearly.     · You pass out (lose consciousness).   If you have a high risk of having been exposed to this virus, or you have tested positive but don't have symptoms, call your doctor now if you develop symptoms such as:    · Shortness of breath.     · Fever.     · Cough.     If you have been diagnosed with coronavirus disease and already have a cough, fever, or shortness of breath, call your doctor now if your symptoms get worse or you don't get better as expected.   Whether you have symptoms or not, call ahead to the doctor's office before you go. Make sure you wear a face mask when you go to the doctor, to prevent exposing other people to the virus. Current as of: March 20, 2020                Content Version: 12.4  © 6233-2172 [employer], [employer]. Care instructions adapted under license by your healthcare professional. If you have questions about a medical condition or this instruction, always ask your healthcare professional. [address] any warranty or liability for your use of this information.

## 2020-04-01 NOTE — CARE COORDINATION
Ambulatory Care Coordination Note  4/1/2020  CM Risk Score: 1  Charlson 10 Year Mortality Risk Score: 47%     ACC: Gene Rose RN    Summary Note: outreach call and spoke to patient's Zeus Jackson who states that patient is getting PT/OT at home and visiting nurse comes twice weekly. Patient is eating and drinking good. She states he is doing much better since being home. Discussed and educated her on the CDC COVID-19 precautions. Gave her the [employer] number [phone] to call with questions or if patient develops symptoms. CM will follow up with patient/Nory in couple weeks. General Assessment    Do you have any symptoms that are causing concern?:  No       Diabetes Assessment    Meal Planning:  None   Do you have barriers with adherence to non-pharmacologic self-management interventions? (Nutrition/Exercise/Self-Monitoring):  No   Have you ever had to go to the ED for symptoms of low blood sugar?:  No       No patient-reported symptoms              Care Coordination Interventions    Program Enrollment:  Complex Care  Referral from Primary Care Provider:  No  Suggested Interventions and [address] [place] [address] Hwy:  Completed (Comment: [facility])  Occupational Therapy:  Completed  Physical Therapy:  Completed  Zone Management Tools:  Completed (Comment: DM zone tool)         Goals Addressed                 This Visit's Progress     Conditions and Symptoms   On track     I will schedule office visits, as directed by my provider. I will keep my appointment or reschedule if I have to cancel. I will notify my provider of any barriers to my plan of care. I will follow my Zone Management tool to seek urgent or emergent care. I will notify my provider of any symptoms that indicate a worsening of my condition.     Barriers: impairment:  physical: mobility and lack of education  Plan for overcoming my barriers: work with Care Manager and Mike Sampson  Confidence: 7/10  Anticipated

## 2020-04-16 ENCOUNTER — CARE COORDINATION (OUTPATIENT)
Dept: CARE COORDINATION | Age: 70
End: 2020-04-16

## 2020-04-16 NOTE — CARE COORDINATION
Ambulatory Care Coordination Note  4/16/2020  CM Risk Score: 1  Charlson 10 Year Mortality Risk Score: 47%     ACC: Harjit Zaman, RN    Summary Note: attempted to contact patient's caregiver jake Jeffrey to return call to [phone]. If no return call, will attempt outreach again next week.        Future Appointments   Date Time Provider Kiara Vazquez   8/5/2020  1:15 PM Shaniqua Jean Baptiste MD [facility] [address] [facility]

## 2020-04-24 ENCOUNTER — CARE COORDINATION (OUTPATIENT)
Dept: CARE COORDINATION | Age: 70
End: 2020-04-24

## 2020-04-24 NOTE — CARE COORDINATION
Provider, MD       Future Appointments   Date Time Provider Kiara Vazquez   8/5/2020  1:15 PM Von Basilio MD [facility] [address] [facility]

## 2020-05-15 ENCOUNTER — CARE COORDINATION (OUTPATIENT)
Dept: CARE COORDINATION | Age: 70
End: 2020-05-15

## 2020-05-20 ENCOUNTER — TELEPHONE (OUTPATIENT)
Dept: INTERNAL MEDICINE CLINIC | Age: 70
End: 2020-05-20

## 2020-06-01 ENCOUNTER — CARE COORDINATION (OUTPATIENT)
Dept: CARE COORDINATION | Age: 70
End: 2020-06-01

## 2020-06-01 NOTE — CARE COORDINATION
Ambulatory Care Coordination Note  6/1/2020  CM Risk Score: 1  Charlson 10 Year Mortality Risk Score: 47%     ACC: Roxann Molina RN    Summary Note: outreach call to patient's caregiver/SO Humberto Ma. She states that patient had a fall and broke his hip. Patient was inpt at [facility] on 5/21/2020-5/26/2020. She states patient is at [facility], he's been there 2 weeks since he left [facility]  . she states that he hates there, he has fallen out of bed couple times. They told her he'll be there 6-8 weeks. She's concerned that his hip going to heal with him falling. She states that she can't take care of him if he is bed ridden. Encouraged her to work with the SW/Discharge planner in regards to his d/c. Informed Humberto Ma that since he is at [facility], this nurse will not be calling. While patient was at [facility] he had a CT that showed lung nodules approximately 6mm- d/t patient hx of smoking will need f/u surveillance with PCP. A 2 cm bladder mass was noted on CT scan. Urology was consulted and will continue surveillance of bladder mass with outpt cystoscopy. Bladder mass ? Neoplasm. Patient was seen by urology inpt and is to have a cysto outpt. CM will remove patient form ACM since he is at [facility]. Care Coordination Interventions    Program Enrollment:  Complex Care  Referral from Primary Care Provider:  No  Suggested Interventions and [address]:  Completed (Comment: [facility])  Occupational Therapy:  Completed  Physical Therapy:  Completed  Zone Management Tools:  Completed (Comment: DM zone tool)         Goals Addressed                 This Visit's Progress     Conditions and Symptoms   On track     I will schedule office visits, as directed by my provider. I will keep my appointment or reschedule if I have to cancel. I will notify my provider of any barriers to my plan of care. I will follow my Zone Management tool to seek urgent or emergent care.   I will notify my provider of any symptoms that indicate a worsening of my condition. Barriers: impairment:  physical: mobility and lack of education  Plan for overcoming my barriers: work with Care Manager and Mike Sampson  Confidence: 7/10  Anticipated Goal Completion Date: 6/1/2020              Prior to Admission medications    Medication Sig Start Date End Date Taking?  Authorizing Provider   losartan (COZAAR) 25 MG tablet TAKE 1 TABLET BY MOUTH DAILY 2/24/20   Elvira Saunders MD   ferrous gluconate (FERGON) 324 (38 Fe) MG tablet Take 324 mg by mouth daily (with breakfast)    Historical Provider, MD   atorvastatin (LIPITOR) 20 MG tablet TAKE 1 TABLET BY MOUTH DAILY TAKE IN THE EVENING 11/4/19   Elvira Saunders MD   metFORMIN (GLUCOPHAGE) 500 MG tablet TAKE ONE TAB 2 TIMES A DAY 11/4/19   Elvira Saunders MD   omeprazole (PRILOSEC) 40 MG delayed release capsule TAKE 1 CAPSULE BY MOUTH EVERY DAY 10/7/19   Elvira Saunders MD   Multiple Vitamins-Minerals (CENTRUM SILVER PO) Take by mouth daily    Historical Provider, MD   Blood Pressure KIT Use as directed 8/15/19   Elvira Saunders MD   vitamin B-12 (CYANOCOBALAMIN) 500 MCG tablet Take 1 tablet by mouth daily  Patient not taking: Reported on 2/3/2020 1/22/19 1/22/20  Elvira Saunders MD   PROCTOZONE-HC 2.5 % rectal cream  5/18/18   Historical Provider, MD   DiphenhydrAMINE HCl (ALLERGY MED PO) Take 1 tablet by mouth daily     Historical Provider, MD   acetaminophen (TYLENOL) 500 MG tablet Take 500 mg by mouth every 6 hours as needed for Pain    Historical Provider, MD       Future Appointments   Date Time Provider Kiara Vazquez   8/5/2020  1:15 PM Elvira Saunders MD [facility]

## 2020-06-25 ENCOUNTER — CARE COORDINATION (OUTPATIENT)
Dept: CASE MANAGEMENT | Age: 70
End: 2020-06-25

## 2020-07-09 ENCOUNTER — TELEPHONE (OUTPATIENT)
Dept: FAMILY MEDICINE CLINIC | Age: 70
End: 2020-07-09

## 2020-07-09 NOTE — TELEPHONE ENCOUNTER
Guru Edwards was contacted to set up an annual wellness visit    Spoke with: left message to schedule awv       Mancel Hind

## 2020-07-10 ENCOUNTER — HOSPITAL ENCOUNTER (OUTPATIENT)
Age: 70
Setting detail: SPECIMEN
Discharge: HOME | End: 2020-07-10
Payer: MEDICARE

## 2020-07-10 LAB
-: ABNORMAL
AMORPHOUS: ABNORMAL
BACTERIA: ABNORMAL
BILIRUBIN URINE: NEGATIVE
CASTS UA: ABNORMAL /LPF (ref 0–2)
CASTS UA: ABNORMAL /LPF (ref 0–2)
COLOR: YELLOW
COMMENT UA: ABNORMAL
CRYSTALS, UA: ABNORMAL /HPF
EPITHELIAL CELLS UA: ABNORMAL /HPF (ref 0–5)
GLUCOSE URINE: NEGATIVE
KETONES, URINE: NEGATIVE
LEUKOCYTE ESTERASE, URINE: ABNORMAL
MUCUS: ABNORMAL
NITRITE, URINE: NEGATIVE
OTHER OBSERVATIONS UA: ABNORMAL
PH UA: 5 (ref 5–8)
PROTEIN UA: ABNORMAL
RBC UA: ABNORMAL /HPF (ref 0–2)
RENAL EPITHELIAL, UA: ABNORMAL /HPF
SPECIFIC GRAVITY UA: 1.02 (ref 1–1.03)
TRICHOMONAS: ABNORMAL
TURBIDITY: CLEAR
URINE HGB: NEGATIVE
UROBILINOGEN, URINE: NORMAL
WBC UA: ABNORMAL /HPF (ref 0–5)
YEAST: ABNORMAL

## 2020-07-10 PROCEDURE — 86403 PARTICLE AGGLUT ANTBDY SCRN: CPT

## 2020-07-10 PROCEDURE — 87186 SC STD MICRODIL/AGAR DIL: CPT

## 2020-07-10 PROCEDURE — 81001 URINALYSIS AUTO W/SCOPE: CPT

## 2020-07-10 PROCEDURE — 87086 URINE CULTURE/COLONY COUNT: CPT

## 2020-07-12 LAB
CULTURE: ABNORMAL
Lab: ABNORMAL
SPECIMEN DESCRIPTION: ABNORMAL

## 2020-07-15 ENCOUNTER — HOSPITAL ENCOUNTER (OUTPATIENT)
Age: 70
Setting detail: SPECIMEN
Discharge: HOME | End: 2020-07-15
Payer: MEDICARE

## 2020-07-15 LAB
ALBUMIN SERPL-MCNC: 4.2 G/DL (ref 3.5–5.2)
ALBUMIN/GLOBULIN RATIO: 1.3 (ref 1–2.5)
ALP BLD-CCNC: 106 U/L (ref 40–129)
ALT SERPL-CCNC: 20 U/L (ref 5–41)
ANION GAP SERPL CALCULATED.3IONS-SCNC: 20 MMOL/L (ref 9–17)
AST SERPL-CCNC: 22 U/L
BILIRUB SERPL-MCNC: 0.37 MG/DL (ref 0.3–1.2)
BUN BLDV-MCNC: 17 MG/DL (ref 8–23)
BUN/CREAT BLD: ABNORMAL (ref 9–20)
CALCIUM SERPL-MCNC: 9.4 MG/DL (ref 8.6–10.4)
CHLORIDE BLD-SCNC: 102 MMOL/L (ref 98–107)
CO2: 20 MMOL/L (ref 20–31)
CREAT SERPL-MCNC: 0.73 MG/DL (ref 0.7–1.2)
GFR AFRICAN AMERICAN: >60 ML/MIN
GFR NON-AFRICAN AMERICAN: >60 ML/MIN
GFR SERPL CREATININE-BSD FRML MDRD: ABNORMAL ML/MIN/{1.73_M2}
GFR SERPL CREATININE-BSD FRML MDRD: ABNORMAL ML/MIN/{1.73_M2}
GLUCOSE BLD-MCNC: 122 MG/DL (ref 70–99)
HCT VFR BLD CALC: 38.4 % (ref 40.7–50.3)
HEMOGLOBIN: 11.7 G/DL (ref 13–17)
MCH RBC QN AUTO: 28.6 PG (ref 25.2–33.5)
MCHC RBC AUTO-ENTMCNC: 30.5 G/DL (ref 28.4–34.8)
MCV RBC AUTO: 93.9 FL (ref 82.6–102.9)
NRBC AUTOMATED: 0 PER 100 WBC
PDW BLD-RTO: 13.7 % (ref 11.8–14.4)
PLATELET # BLD: 695 K/UL (ref 138–453)
PMV BLD AUTO: 10.2 FL (ref 8.1–13.5)
POTASSIUM SERPL-SCNC: 4.4 MMOL/L (ref 3.7–5.3)
RBC # BLD: 4.09 M/UL (ref 4.21–5.77)
SODIUM BLD-SCNC: 142 MMOL/L (ref 135–144)
THYROXINE, FREE: 1 NG/DL (ref 0.93–1.7)
TOTAL PROTEIN: 7.4 G/DL (ref 6.4–8.3)
TSH SERPL DL<=0.05 MIU/L-ACNC: 0.97 MIU/L (ref 0.3–5)
VITAMIN B-12: 719 PG/ML (ref 232–1245)
WBC # BLD: 14.7 K/UL (ref 3.5–11.3)

## 2020-07-15 PROCEDURE — 82607 VITAMIN B-12: CPT

## 2020-07-15 PROCEDURE — 84439 ASSAY OF FREE THYROXINE: CPT

## 2020-07-15 PROCEDURE — 85027 COMPLETE CBC AUTOMATED: CPT

## 2020-07-15 PROCEDURE — 36415 COLL VENOUS BLD VENIPUNCTURE: CPT

## 2020-07-15 PROCEDURE — 80053 COMPREHEN METABOLIC PANEL: CPT

## 2020-07-15 PROCEDURE — 84443 ASSAY THYROID STIM HORMONE: CPT

## 2020-07-15 PROCEDURE — P9603 ONE-WAY ALLOW PRORATED MILES: HCPCS

## 2020-08-12 ENCOUNTER — HOSPITAL ENCOUNTER (OUTPATIENT)
Age: 70
Setting detail: SPECIMEN
Discharge: HOME | End: 2020-08-12
Payer: MEDICARE

## 2020-08-12 LAB
ESTIMATED AVERAGE GLUCOSE: 148 MG/DL
HBA1C MFR BLD: 6.8 % (ref 4–6)
HCT VFR BLD CALC: 38.5 % (ref 40.7–50.3)
HEMOGLOBIN: 11.6 G/DL (ref 13–17)
MCH RBC QN AUTO: 28.6 PG (ref 25.2–33.5)
MCHC RBC AUTO-ENTMCNC: 30.1 G/DL (ref 28.4–34.8)
MCV RBC AUTO: 94.8 FL (ref 82.6–102.9)
NRBC AUTOMATED: 0 PER 100 WBC
PDW BLD-RTO: 14.6 % (ref 11.8–14.4)
PLATELET # BLD: 651 K/UL (ref 138–453)
PMV BLD AUTO: 10.2 FL (ref 8.1–13.5)
RBC # BLD: 4.06 M/UL (ref 4.21–5.77)
WBC # BLD: 15.8 K/UL (ref 3.5–11.3)

## 2020-08-12 PROCEDURE — 83036 HEMOGLOBIN GLYCOSYLATED A1C: CPT

## 2020-08-12 PROCEDURE — 85027 COMPLETE CBC AUTOMATED: CPT

## 2020-08-12 PROCEDURE — 36415 COLL VENOUS BLD VENIPUNCTURE: CPT

## 2020-08-12 PROCEDURE — P9603 ONE-WAY ALLOW PRORATED MILES: HCPCS

## 2020-08-12 PROCEDURE — U0003 INFECTIOUS AGENT DETECTION BY NUCLEIC ACID (DNA OR RNA); SEVERE ACUTE RESPIRATORY SYNDROME CORONAVIRUS 2 (SARS-COV-2) (CORONAVIRUS DISEASE [COVID-19]), AMPLIFIED PROBE TECHNIQUE, MAKING USE OF HIGH THROUGHPUT TECHNOLOGIES AS DESCRIBED BY CMS-2020-01-R: HCPCS

## 2020-08-15 LAB — SARS-COV-2, NAA: NOT DETECTED

## 2020-09-02 ENCOUNTER — HOSPITAL ENCOUNTER (OUTPATIENT)
Age: 70
Setting detail: SPECIMEN
Discharge: HOME | End: 2020-09-02
Payer: MEDICARE

## 2020-09-02 LAB
TOTAL CK: 129 U/L (ref 39–308)
URIC ACID: 4 MG/DL (ref 3.4–7)
VITAMIN B-12: 592 PG/ML (ref 232–1245)
VITAMIN D 25-HYDROXY: 26.5 NG/ML (ref 30–100)

## 2020-09-02 PROCEDURE — P9603 ONE-WAY ALLOW PRORATED MILES: HCPCS

## 2020-09-02 PROCEDURE — 82607 VITAMIN B-12: CPT

## 2020-09-02 PROCEDURE — 82550 ASSAY OF CK (CPK): CPT

## 2020-09-02 PROCEDURE — 84550 ASSAY OF BLOOD/URIC ACID: CPT

## 2020-09-02 PROCEDURE — 82306 VITAMIN D 25 HYDROXY: CPT

## 2020-09-02 PROCEDURE — 36415 COLL VENOUS BLD VENIPUNCTURE: CPT

## 2020-09-09 ENCOUNTER — CARE COORDINATION (OUTPATIENT)
Dept: CASE MANAGEMENT | Age: 70
End: 2020-09-09

## 2020-09-09 NOTE — CARE COORDINATION
KLARISSA RATLIFF EMAIL:    Facility Dischargin [facility] Robyn  Discharge Date: 2020  Services at Discharge: Home health  Patient Agreeable to Calls: Yes(wife)    68year old male transferred to SNF from another SNF where he was at for Rehab following acute stay due to Left Femur Fracture. Ortho was consulted and conservative measured taken at this time with non-surgical intervention. Patient has significant hx of Dementia/Alzheimer's. Patient is WBAT. Pt was having falls at the prior SNF and Pt wife was unable with his care and requested transfer. Current Medical: Pt appears Medically stable. PLOF: Patient lives with his spouse in a 1 SH with 2 BEBE. Patient was Mod I for ambulating with a Cane and dressing and toileting. Wife assists with bathing in shower with seat. Wife does cooking and cleaning, daughter does shopping. Takes a taxi to MD appointments. Patient has assist through  [employer] 2x/wk for house work. CLOF: Pt able to ambulate up to 50-60 feet with CGA with FWW, 5 steps with BHR with CGA, transfers are overall CGA. No change with ADLs; feeding and grooming at [facility], UE ADLs at [facility]/set up/CGA, LE ADLs at [facility], toileting at CGA to min. Cognition: mild deficits with memory and PS deficits. impaired safety. SLP is discharging  due to max rehab. Education provided to spouse regarding recommendations for DME, home set up for safety and assist [facility] the home with [facility] as well as home health. Pt wife needs assist on the weekends as she assists her mother on the weekends. Private duty assist info provided as well.        BRIEF BACKGROUND:

## 2020-09-10 ENCOUNTER — CARE COORDINATION (OUTPATIENT)
Dept: CASE MANAGEMENT | Age: 70
End: 2020-09-10

## 2020-09-10 PROCEDURE — 1111F DSCHRG MED/CURRENT MED MERGE: CPT | Performed by: INTERNAL MEDICINE

## 2020-09-10 NOTE — CARE COORDINATION
[facility] Transitions Initial Follow Up Call    Call within 2 business days of discharge: Yes    Patient: Anders Soria Patient : 1950   MRN: [id number]  Reason for Admission: Left hip fracture  Discharge Date: 3/8/18 RARS: No data recorded    Last Discharge [facility]       Complaint Diagnosis Description Type Department Provider    3/8/18 Hip Pain Contusion of left hip, initial encounter ED (DISCHARGE) [facility] Macho Ferrer MD      Spoke with Gwen Wolf after 2 pt ids    [facility] [address] [facility]  for hip injury on  then to  [facility] then to [facility]- dementia unit    Sig Hx: Dementia/Alzheimers, falls, DMII, HTN,   Per aaron:  Patient lives with his spouse in a 1 SH with 2 BEBE. Patient was Mod I for ambulating with a Cane and dressing and toileting. Wife assists with bathing in shower with seat. Wife does cooking and cleaning, daughter does shopping. Takes a taxi to MD appointments. Patient has assist through [place] 2x/wk for house work. CLOF: Pt able to ambulate up to 50-60 feet with CGA with FWW, 5 steps with BHR with CGA, transfers are overall CGA. No change with ADLs; feeding and grooming at [facility], UE ADLs at [facility]/set up/CGA, LE ADLs at [facility], toileting at CGA to min. Cognition: mild deficits with memory and PS deficits. impaired safety. SLP is discharging  due to max rehab. Education provided to spouse regarding recommendations for DME, home set up for safety and assist in the home with [facility] as well as home health. Gwen Wolf states pt is doing much better at home. Believes the extra stimulation helps with the dementia. Confirms HH has contacted her. Pt is at times very steady on his feet and at other not so steady. He has a bell that he rings to ensure that someone is there to be at his side whne he walks. Has not called for f/u appts yet but will do so. Has not measured blood sugar and I educated on the need to monitor this. P is getting ensure and snacks.  His appetite is much better now that he is at home but he was a 32 waist and is now a 28 so he has lost weight. Educated about more frequent small portions and finger foods. Pt still able to swallow pills with water but educated about applesauce if needed. Did state that neurologist is happy with his progress. Will contact to ensure f/u with PCP made and that diet recommendations are helpful and to offer a dietician review      Atiya Neves RN, [phone]    Miryam Raya is a 68 y.o. male recently discharged from [facility] with a reported diagnosis of L hip fracture. The patient was contacted post discharge and the answers were provided by Libby Lazo  [facility] had  understanding of reason for admission. Medications were reviewed. Coaching for symptoms related to disease demonstrates  and demonstrates  self-management skills. The patient was coached to monitor blood sugars. The patient will receive a follow up call to monitor adherence and understanding as part of Transitions of Care. Atiya Neves  9/10/2020  . pattie         Non-face-to-face services provided:  Education of patient/family/caregiver/guardian to support self-management-diet     Care Transitions 24 Hour Call    Do you have any ongoing symptoms?:  No  Do you have a copy of your discharge instructions?:  Yes  Do you have all of your prescriptions and are they filled?:  Yes  Have you been contacted by a [address]?:  No  Have you scheduled your follow up appointment?:  No  Were you discharged with any Home Care or Post Acute Services:  Yes  Post Acute Services:  Home Health (Comment: [facility])  Patient DME:  Pio Moore transfer bench  Do you have support at home?:  Partner/Spouse/SO  Do you feel like you have everything you need to keep you well at home?:  Yes  Are you an active caregiver in your home?:  No  Care Transitions Interventions         Follow Up  No future appointments.     Atiya Neves RN

## 2020-10-13 ENCOUNTER — OFFICE VISIT (OUTPATIENT)
Dept: INTERNAL MEDICINE CLINIC | Age: 70
End: 2020-10-13
Payer: MEDICARE

## 2020-10-13 VITALS
BODY MASS INDEX: 16.4 KG/M2 | SYSTOLIC BLOOD PRESSURE: 132 MMHG | TEMPERATURE: 97 F | WEIGHT: 104.5 LBS | RESPIRATION RATE: 25 BRPM | DIASTOLIC BLOOD PRESSURE: 62 MMHG | OXYGEN SATURATION: 98 % | HEIGHT: 67 IN | HEART RATE: 112 BPM

## 2020-10-13 PROBLEM — E11.21 TYPE 2 DIABETES MELLITUS WITH DIABETIC NEPHROPATHY, WITHOUT LONG-TERM CURRENT USE OF INSULIN (HCC): Status: ACTIVE | Noted: 2018-09-21

## 2020-10-13 PROCEDURE — 86580 TB INTRADERMAL TEST: CPT | Performed by: INTERNAL MEDICINE

## 2020-10-13 PROCEDURE — 1111F DSCHRG MED/CURRENT MED MERGE: CPT | Performed by: INTERNAL MEDICINE

## 2020-10-13 PROCEDURE — 99215 OFFICE O/P EST HI 40 MIN: CPT | Performed by: INTERNAL MEDICINE

## 2020-10-13 RX ORDER — LOSARTAN POTASSIUM 25 MG/1
25 TABLET ORAL DAILY
Qty: 30 TABLET | Refills: 5 | Status: SHIPPED | OUTPATIENT
Start: 2020-10-13

## 2020-10-13 RX ORDER — CETIRIZINE HYDROCHLORIDE 10 MG/1
10 TABLET ORAL DAILY
COMMUNITY
Start: 2020-03-18 | End: 2020-10-13 | Stop reason: SDUPTHER

## 2020-10-13 RX ORDER — TRAZODONE HYDROCHLORIDE 50 MG/1
50 TABLET ORAL NIGHTLY
Qty: 30 TABLET | Refills: 5 | Status: SHIPPED | OUTPATIENT
Start: 2020-10-13

## 2020-10-13 RX ORDER — ATORVASTATIN CALCIUM 20 MG/1
TABLET, FILM COATED ORAL
Qty: 30 TABLET | Refills: 5 | Status: SHIPPED | OUTPATIENT
Start: 2020-10-13

## 2020-10-13 RX ORDER — CETIRIZINE HYDROCHLORIDE 10 MG/1
10 TABLET ORAL DAILY
Qty: 30 TABLET | Refills: 5 | Status: SHIPPED | OUTPATIENT
Start: 2020-10-13

## 2020-10-13 RX ORDER — OMEPRAZOLE 40 MG/1
CAPSULE, DELAYED RELEASE ORAL
Qty: 30 CAPSULE | Refills: 5 | Status: SHIPPED | OUTPATIENT
Start: 2020-10-13

## 2020-10-13 NOTE — PROGRESS NOTES
oriented to place, person and time ,appears well-developed and well-nourished  HEENT:  Atraumatic and normocephalic, external ears normal bilaterally, nose normal no oropharyngeal exudate and is clear and moist  Eyes:  EOCM normal; conjunctivae normal; PERRLA bilaterally  Neck:  Normal range of motion, neck supple, no JVD and no thyromegaly  Cardiovascular:  RRR, normal heart sounds and intact distal pulses  Pulmonary:  effort normal and breath sounds normal bilaterally,no wheezes or rales, no respiratory distress  Abdominal:  Soft, non-tender; normal bowel sounds, no masses  Musculoskeletal:  Normal range of motion and no edema or tenderness bilaterally decreased strength in musculature and both upper and lower extremities no tremors noted  No lymphadenopathy  Neurological:  alert, oriented, and normal reflexes bilaterally  Skin: warm and dry  Psychiatric:  normal mood and effect; behavior normal.    Labs:   Lab Results   Component Value Date    LABA1C 6.8 (H) 08/12/2020     Lab Results   Component Value Date    CHOL 179 01/18/2019     Lab Results   Component Value Date    HDL 40 (L) 01/18/2019     No results found for: [address]  Lab Results   Component Value Date    TRIG 170 (H) 01/18/2019     No components found for: [place], [place]  Lab Results   Component Value Date    WBC 15.8 (H) 08/12/2020    HGB 11.6 (L) 08/12/2020    HCT 38.5 (L) 08/12/2020    MCV 94.8 08/12/2020     (H) 08/12/2020     Lab Results   Component Value Date    INR 1.0 01/08/2020    PROTIME 10.2 01/08/2020     Lab Results   Component Value Date    GLUCOSE 122 (H) 07/15/2020    CREATININE 0.73 07/15/2020    BUN 17 07/15/2020     07/15/2020    K 4.4 07/15/2020     07/15/2020    CO2 20 07/15/2020     Lab Results   Component Value Date    ALT 20 07/15/2020    AST 22 07/15/2020    ALKPHOS 106 07/15/2020    BILITOT 0.37 07/15/2020     Lab Results   Component Value Date    LABALBU 4.2 07/15/2020     Lab Results   Component Value Date    TSH 0.97 07/15/2020     Assessment:  1. Memory problem    2. Falls frequently    3. Type 2 diabetes mellitus with diabetic nephropathy, without long-term current use of insulin ([facility] [address]    4. Essential hypertension    5. Weight loss        Plan:  Patient had a fall and a fracture of left hip in May 2020 and patient was treated and transferred to rehab and patient was in rehab for June July August and September and came home in September and during his stay in the nursing home patient was evaluated by different doctors and was diagnosed with Parkinson's disease and dementia and patient was started on Exelon and Sinemet and patient after that developed lethargy and patient was taken to [facility] emergency room and was admitted for possible overdose of the medications and some of the medications including Exelon and Sinemet were discontinued and patient is more awake and I did explain to the patient's wife or significant other that as patient is doing well for the last 2 weeks not to restart other medications and will wait until Dr. Ayo Hagen neurology evaluate the patient on 3 November and decide if she really needs and personally I do not think he needs the medications and he does not have Parkinson's disease clinically at this time but he is so weak to walk without assistance and is wheelchair-bound and I do not see any tremors  Patient's blood pressure is stable on current medications and continue same and patient advised to continue metformin for his diabetes  TB skin test done to complete forms for [facility] where patient goes every Monday and Wednesday for training and adult care services  Review in 6 months           1. Kai Frank received counseling on the following healthy behaviors: nutrition and exercise    2. Prior labs and health maintenance reviewed. 3.  Discussed use, benefit, and side effects of prescribed medications. Barriers to medication compliance addressed. All his questions were answered. Pt voiced understanding. Kei Folks will continue current medications, diet and exercise. Orders Placed This Encounter   Medications    atorvastatin (LIPITOR) 20 MG tablet     Sig: TAKE 1 TABLET BY MOUTH DAILY TAKE IN THE EVENING     Dispense:  30 tablet     Refill:  5    losartan (COZAAR) 25 MG tablet     Sig: Take 1 tablet by mouth daily     Dispense:  30 tablet     Refill:  5    cetirizine (ZYRTEC ALLERGY) 10 MG tablet     Sig: Take 1 tablet by mouth daily Take 10 mg by mouth daily     Dispense:  30 tablet     Refill:  5    metFORMIN (GLUCOPHAGE) 500 MG tablet     Sig: TAKE ONE TAB 2 TIMES A DAY     Dispense:  60 tablet     Refill:  5    omeprazole (PRILOSEC) 40 MG delayed release capsule     Sig: TAKE 1 CAPSULE BY MOUTH EVERY DAY     Dispense:  30 capsule     Refill:  5    traZODone (DESYREL) 50 MG tablet     Sig: Take 1 tablet by mouth nightly     Dispense:  30 tablet     Refill:  5          Completed Refills               Requested Prescriptions     Signed Prescriptions Disp Refills    atorvastatin (LIPITOR) 20 MG tablet 30 tablet 5     Sig: TAKE 1 TABLET BY MOUTH DAILY TAKE IN THE EVENING    losartan (COZAAR) 25 MG tablet 30 tablet 5     Sig: Take 1 tablet by mouth daily    cetirizine (ZYRTEC ALLERGY) 10 MG tablet 30 tablet 5     Sig: Take 1 tablet by mouth daily Take 10 mg by mouth daily    metFORMIN (GLUCOPHAGE) 500 MG tablet 60 tablet 5     Sig: TAKE ONE TAB 2 TIMES A DAY    omeprazole (PRILOSEC) 40 MG delayed release capsule 30 capsule 5     Sig: TAKE 1 CAPSULE BY MOUTH EVERY DAY    traZODone (DESYREL) 50 MG tablet 30 tablet 5     Sig: Take 1 tablet by mouth nightly     4. Patient given educational materials - see patient instructions    5. Was a self-tracking handout given in paper form or via [employer]? NO    Orders Placed This Encounter   Procedures    Mantoux testing     No follow-ups on file. Patient voiced understanding and agreed to treatment plan.      Electronically signed

## 2020-10-16 ENCOUNTER — TELEPHONE (OUTPATIENT)
Dept: INTERNAL MEDICINE CLINIC | Age: 70
End: 2020-10-16

## 2020-10-16 NOTE — TELEPHONE ENCOUNTER
I did not change any medications and was continued on the same medication that he was discharged from [facility] and I did talk to the neurologist and she agreed to see him and evaluate him on [phone] and if patient has any symptoms getting worse can go to the emergency room and did he get the TB skin test read today?

## 2020-10-27 ENCOUNTER — OFFICE VISIT (OUTPATIENT)
Dept: NEUROLOGY | Age: 70
End: 2020-10-27
Payer: MEDICARE

## 2020-10-27 VITALS
HEART RATE: 114 BPM | TEMPERATURE: 97.2 F | WEIGHT: 105 LBS | BODY MASS INDEX: 16.44 KG/M2 | SYSTOLIC BLOOD PRESSURE: 139 MMHG | DIASTOLIC BLOOD PRESSURE: 73 MMHG

## 2020-10-27 PROCEDURE — G8427 DOCREV CUR MEDS BY ELIG CLIN: HCPCS | Performed by: PSYCHIATRY & NEUROLOGY

## 2020-10-27 PROCEDURE — 3017F COLORECTAL CA SCREEN DOC REV: CPT | Performed by: PSYCHIATRY & NEUROLOGY

## 2020-10-27 PROCEDURE — G8484 FLU IMMUNIZE NO ADMIN: HCPCS | Performed by: PSYCHIATRY & NEUROLOGY

## 2020-10-27 PROCEDURE — 4040F PNEUMOC VAC/ADMIN/RCVD: CPT | Performed by: PSYCHIATRY & NEUROLOGY

## 2020-10-27 PROCEDURE — 4004F PT TOBACCO SCREEN RCVD TLK: CPT | Performed by: PSYCHIATRY & NEUROLOGY

## 2020-10-27 PROCEDURE — G8419 CALC BMI OUT NRM PARAM NOF/U: HCPCS | Performed by: PSYCHIATRY & NEUROLOGY

## 2020-10-27 PROCEDURE — 1123F ACP DISCUSS/DSCN MKR DOCD: CPT | Performed by: PSYCHIATRY & NEUROLOGY

## 2020-10-27 PROCEDURE — 99214 OFFICE O/P EST MOD 30 MIN: CPT | Performed by: PSYCHIATRY & NEUROLOGY

## 2020-10-27 RX ORDER — MELATONIN
1000 DAILY
Qty: 90 TABLET | Refills: 1 | Status: SHIPPED | OUTPATIENT
Start: 2020-10-27

## 2020-10-27 RX ORDER — ASPIRIN 81 MG/1
81 TABLET ORAL DAILY
COMMUNITY

## 2020-10-27 NOTE — PROGRESS NOTES
[facility]            [facility], [address]; [place], [address]    [address] [facility], [address], [place], [address] [facility]            Dept: [phone]          Dept Fax: [phone]             MD Cherri Chin MD Ahmed B. Mikle Ditch, MD Francine Meiers, MD Berneda Bong, MD Medford Pickle, CNP               NEUROLOGY FOLLOW UP NOTE                                          PATIENT NAME: Sophy Grajeda   PATIENT MRN: [id number]  FOLLOW UP TODAY: 10/27/2020     Dear Dr. Silvana Schmid MD,     I had the pleasure of seeing your patient Sophy Grajeda, who comes for follow up. CHIEF COMPLAINT: Neurologic Problem (follow up)     INITIAL & INTERVAL HISTORY:     Edd Alexandria a 68year old [facility], last seen on 2/14/2020, pt returns for follow up evaluating his mental status.      Pt comes with his significant other (has been together for 23 years) to clinic today. Since last visit, he was hospitalized twice, significant other gives the history. May 21-26/2020, hospitalized at [facility] after he fell, fractured his left hip, then he was discharged to [address] [place] [address] in [facility] [place] where he stayed for 4 weeks, he fell 5 times in that nursing home, then he was transferred to another NH, called [address] [place] [address], because [employer] said they do not take dementia patients. In rehab center, he was diagnosed with parkinson disease and dementia, was started on Exelon and Sinement. Patient finally got home on 9/5/2020, per significant other, he did not get enough nutrition, when he got home, he was very skinny. 10/1-3/2020, he was hospitalized at [facility] again for confusion because significant other gave his night time medication in the morning? then she could not wake him up.  In the hospital, MG tablet TAKE 1 TABLET BY MOUTH DAILY TAKE IN THE EVENING 30 tablet 5    losartan (COZAAR) 25 MG tablet Take 1 tablet by mouth daily 30 tablet 5    cetirizine (ZYRTEC ALLERGY) 10 MG tablet Take 1 tablet by mouth daily Take 10 mg by mouth daily 30 tablet 5    metFORMIN (GLUCOPHAGE) 500 MG tablet TAKE ONE TAB 2 TIMES A DAY 60 tablet 5    omeprazole (PRILOSEC) 40 MG delayed release capsule TAKE 1 CAPSULE BY MOUTH EVERY DAY 30 capsule 5    traZODone (DESYREL) 50 MG tablet Take 1 tablet by mouth nightly 30 tablet 5    DiphenhydrAMINE HCl (ALLERGY MED PO) Take 1 tablet by mouth daily       ferrous gluconate (FERGON) 324 (38 Fe) MG tablet Take 324 mg by mouth daily (with breakfast)      Multiple Vitamins-Minerals (CENTRUM SILVER PO) Take by mouth daily      Blood Pressure KIT Use as directed (Patient not taking: Reported on 10/13/2020) 1 kit 0    vitamin B-12 (CYANOCOBALAMIN) 500 MCG tablet Take 1 tablet by mouth daily (Patient not taking: Reported on 2/3/2020) 30 tablet 5    PROCTOZONE-HC 2.5 % rectal cream   5    acetaminophen (TYLENOL) 500 MG tablet Take 500 mg by mouth every 6 hours as needed for Pain       No current facility-administered medications for this visit. LABS & TESTS:      Lab Results   Component Value Date    WBC 15.8 (H) 08/12/2020    HGB 11.6 (L) 08/12/2020    HCT 38.5 (L) 08/12/2020    MCV 94.8 08/12/2020     (H) 08/12/2020       REVIEW OF SYSTEMS:       All items selected indicate a positive finding. Those items not selected are negative.   Constitutional [x] Weight loss/gain   [] Fatigue  [] Fever/Chills   HEENT [] Hearing Loss  [] Visual Disturbance  [] Tinnitus  [] Eye pain   Respiratory [] Shortness of Breath  [] Cough  [] Snoring   Cardiovascular [] Chest Pain  [] Palpitations  [] Lightheaded   GI [] Constipation  [] Diarrhea  [] Swallowing change  [] Nausea/vomiting    [] Urinary Frequency  [] Urinary Urgency   Musculoskeletal [] Neck pain  [] Back pain  [] Muscle pain  [] Restless legs   Dermatologic [] Skin changes   Neurologic [x] Memory loss/confusion  [] Seizures  [x] Trouble walking or imbalance  [] Dizziness  [] Sleep disturbance  [] Weakness  [] Numbness  [] Tremors  [] Speech Difficulty  [] Headaches  [] Light Sensitivity  [] Sound Sensitivity   Endocrinology []Excessive thirst  []Excessive hunger   Psychiatric [] Anxiety/Depression  [] Hallucination   Allergy/immunology []Hives/environmental allergies   Hematologic/lymph [] Abnormal bleeding  [] Abnormal bruising     VITALS  /73 (Site: Left Upper Arm, Position: Sitting, Cuff Size: Medium Adult)   Pulse 114   Temp 97.2 °F (36.2 °C)   Wt 105 lb (47.6 kg)   BMI 16.44 kg/m²      PHYSICAL EXAMINATIONS:     General appearance: cooperative  Skin: no rash or skin lesions. HEENT: normocephalic  Optic Fundi: deferred  Neck: supple, no cervcical adenopathy or carotid bruit  Lungs: clear to auscultation  Heart: Regular rate and rhythm, normal S1, S2. No murmurs, clicks or gallops. Peripheral pulses: radial pulses palpable  Abdominal: BS present, soft, NT, ND  Extremities: no edema    NEUROLOGICAL EXAMINATION:     MS: awake, alert and oriented to year/season/presient/place, but not to month/date. No aphasia, dysarthria,   CNs: PERRLA, EOMI, VF full, sensation intact, face symmetric, hearing intact, soft palate rises on phonation, sternocleidomastoid and trapezius intact. Tongue midline, no fasciculations. Motor: no tremors, tone and bulk okay. Difficulty getting up from sitting position  RUE: delta 5/5, biceps 5/5, triceps 5/5,  5/5  LUE: delta 5/5, biceps 5/5, triceps 5/5,  5/5  RLE: hf 4+/5, ke 4+/5, df 5/5, pf 5/5  LLE: hf 4/5, ke 4/5, df 5/5, pf 5/5  Reflexes: 1+ throughout, symmetric, babinski not present. Coordination: FNF no dysmetria, heel to shin okay, NASIM okay, negative Rhomberg.    Gait: shuffling gait, small steps, difficulty initiate walking, Tandem deferred  Sensory: stock distribution reduction below both knees, no extinction. ASSESSMENT:     1. 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 Cognitive impairment  3. History of stroke    PLANS:      1. Restart sinement at 25/100mg TID  2. Safety precaution and fall precaution   3. Stroke secondary prevention with normal BP, BG, LDL, continue ASA 81mg     >50% of 25 minute face to face time spent counseling patient, multiple issues discussed, all questions answered.      RTC in 2-3 months      Daria Burgess MD, MS

## 2020-10-27 NOTE — PATIENT INSTRUCTIONS
1. Consider restart Sinemet at 25/100mg three times a day   2. Keep good blood pressure and sugar control   3. Fall precaution  4.  Vitamin D supplement     Return in 3 months     Artis Whiting MD, MS

## 2020-11-17 ENCOUNTER — HOSPITAL ENCOUNTER (OUTPATIENT)
Age: 70
Setting detail: SPECIMEN
Discharge: HOME | End: 2020-11-17
Payer: MEDICARE

## 2020-11-17 PROCEDURE — U0003 INFECTIOUS AGENT DETECTION BY NUCLEIC ACID (DNA OR RNA); SEVERE ACUTE RESPIRATORY SYNDROME CORONAVIRUS 2 (SARS-COV-2) (CORONAVIRUS DISEASE [COVID-19]), AMPLIFIED PROBE TECHNIQUE, MAKING USE OF HIGH THROUGHPUT TECHNOLOGIES AS DESCRIBED BY CMS-2020-01-R: HCPCS

## 2020-11-20 LAB — SARS-COV-2, NAA: NOT DETECTED

## 2020-11-23 ENCOUNTER — HOSPITAL ENCOUNTER (OUTPATIENT)
Age: 70
Setting detail: SPECIMEN
Discharge: HOME | End: 2020-11-23
Payer: MEDICARE

## 2020-11-23 PROCEDURE — U0003 INFECTIOUS AGENT DETECTION BY NUCLEIC ACID (DNA OR RNA); SEVERE ACUTE RESPIRATORY SYNDROME CORONAVIRUS 2 (SARS-COV-2) (CORONAVIRUS DISEASE [COVID-19]), AMPLIFIED PROBE TECHNIQUE, MAKING USE OF HIGH THROUGHPUT TECHNOLOGIES AS DESCRIBED BY CMS-2020-01-R: HCPCS

## 2020-11-25 ENCOUNTER — HOSPITAL ENCOUNTER (OUTPATIENT)
Age: 70
Setting detail: SPECIMEN
Discharge: HOME | End: 2020-11-25
Payer: MEDICARE

## 2020-11-25 LAB
-: NORMAL
ESTIMATED AVERAGE GLUCOSE: 148 MG/DL
HBA1C MFR BLD: 6.8 % (ref 4–6)
HCT VFR BLD CALC: 32.1 % (ref 40.7–50.3)
HEMOGLOBIN: 9.5 G/DL (ref 13–17)
MCH RBC QN AUTO: 28.9 PG (ref 25.2–33.5)
MCHC RBC AUTO-ENTMCNC: 29.6 G/DL (ref 28.4–34.8)
MCV RBC AUTO: 97.6 FL (ref 82.6–102.9)
NRBC AUTOMATED: 0 PER 100 WBC
PDW BLD-RTO: 14.3 % (ref 11.8–14.4)
PLATELET # BLD: ABNORMAL K/UL (ref 138–453)
PLATELET, FLUORESCENCE: 1264 K/UL (ref 138–453)
PLATELET, IMMATURE FRACTION: 1.7 % (ref 1.1–10.3)
PMV BLD AUTO: ABNORMAL FL (ref 8.1–13.5)
RBC # BLD: 3.29 M/UL (ref 4.21–5.77)
REASON FOR REJECTION: NORMAL
SARS-COV-2, NAA: NOT DETECTED
WBC # BLD: 23.2 K/UL (ref 3.5–11.3)
ZZ NTE CLEAN UP: ORDERED TEST: NORMAL
ZZ NTE WITH NAME CLEAN UP: SPECIMEN SOURCE: NORMAL

## 2020-11-25 PROCEDURE — 83036 HEMOGLOBIN GLYCOSYLATED A1C: CPT

## 2020-11-25 PROCEDURE — 85027 COMPLETE CBC AUTOMATED: CPT

## 2020-11-25 PROCEDURE — 36415 COLL VENOUS BLD VENIPUNCTURE: CPT

## 2020-11-25 PROCEDURE — P9603 ONE-WAY ALLOW PRORATED MILES: HCPCS

## 2020-11-25 PROCEDURE — 85055 RETICULATED PLATELET ASSAY: CPT

## 2020-11-27 ENCOUNTER — HOSPITAL ENCOUNTER (OUTPATIENT)
Age: 70
Setting detail: SPECIMEN
Discharge: HOME | End: 2020-11-27
Payer: MEDICARE

## 2020-11-27 ENCOUNTER — TELEPHONE (OUTPATIENT)
Dept: FAMILY MEDICINE CLINIC | Age: 70
End: 2020-11-27

## 2020-11-27 LAB
HCT VFR BLD CALC: 32 % (ref 40.7–50.3)
HEMOGLOBIN: 9.4 G/DL (ref 13–17)
MCH RBC QN AUTO: 28.5 PG (ref 25.2–33.5)
MCHC RBC AUTO-ENTMCNC: 29.4 G/DL (ref 28.4–34.8)
MCV RBC AUTO: 97 FL (ref 82.6–102.9)
NRBC AUTOMATED: 0 PER 100 WBC
PDW BLD-RTO: 14.3 % (ref 11.8–14.4)
PLATELET # BLD: ABNORMAL K/UL (ref 138–453)
PLATELET, FLUORESCENCE: 1290 K/UL (ref 138–453)
PLATELET, IMMATURE FRACTION: 1.4 % (ref 1.1–10.3)
PMV BLD AUTO: ABNORMAL FL (ref 8.1–13.5)
RBC # BLD: 3.3 M/UL (ref 4.21–5.77)
WBC # BLD: 20.2 K/UL (ref 3.5–11.3)

## 2020-11-27 PROCEDURE — 85055 RETICULATED PLATELET ASSAY: CPT

## 2020-11-27 PROCEDURE — P9603 ONE-WAY ALLOW PRORATED MILES: HCPCS

## 2020-11-27 PROCEDURE — 85027 COMPLETE CBC AUTOMATED: CPT

## 2020-11-27 PROCEDURE — 36415 COLL VENOUS BLD VENIPUNCTURE: CPT

## 2020-12-07 ENCOUNTER — HOSPITAL ENCOUNTER (OUTPATIENT)
Age: 70
Setting detail: SPECIMEN
Discharge: HOME | End: 2020-12-07
Payer: MEDICARE

## 2020-12-07 PROCEDURE — U0003 INFECTIOUS AGENT DETECTION BY NUCLEIC ACID (DNA OR RNA); SEVERE ACUTE RESPIRATORY SYNDROME CORONAVIRUS 2 (SARS-COV-2) (CORONAVIRUS DISEASE [COVID-19]), AMPLIFIED PROBE TECHNIQUE, MAKING USE OF HIGH THROUGHPUT TECHNOLOGIES AS DESCRIBED BY CMS-2020-01-R: HCPCS

## 2020-12-09 LAB — SARS-COV-2, NAA: NOT DETECTED

## 2021-11-01 ENCOUNTER — TELEPHONE (OUTPATIENT)
Dept: FAMILY MEDICINE CLINIC | Age: 71
End: 2021-11-01
